# Patient Record
Sex: MALE | Race: ASIAN | NOT HISPANIC OR LATINO | URBAN - METROPOLITAN AREA
[De-identification: names, ages, dates, MRNs, and addresses within clinical notes are randomized per-mention and may not be internally consistent; named-entity substitution may affect disease eponyms.]

---

## 2018-11-12 ENCOUNTER — EMERGENCY (EMERGENCY)
Facility: HOSPITAL | Age: 28
LOS: 0 days | Discharge: HOME | End: 2018-11-12
Attending: EMERGENCY MEDICINE | Admitting: EMERGENCY MEDICINE
Payer: MEDICAID

## 2018-11-12 VITALS
OXYGEN SATURATION: 100 % | SYSTOLIC BLOOD PRESSURE: 118 MMHG | DIASTOLIC BLOOD PRESSURE: 74 MMHG | RESPIRATION RATE: 18 BRPM | HEART RATE: 92 BPM

## 2018-11-12 VITALS
DIASTOLIC BLOOD PRESSURE: 91 MMHG | OXYGEN SATURATION: 100 % | RESPIRATION RATE: 18 BRPM | HEART RATE: 69 BPM | TEMPERATURE: 98 F | WEIGHT: 169.98 LBS | SYSTOLIC BLOOD PRESSURE: 133 MMHG | HEIGHT: 66 IN

## 2018-11-12 DIAGNOSIS — R55 SYNCOPE AND COLLAPSE: ICD-10-CM

## 2018-11-12 LAB
ALBUMIN SERPL ELPH-MCNC: 4 G/DL — SIGNIFICANT CHANGE UP (ref 3.3–5)
ALP SERPL-CCNC: 47 U/L — SIGNIFICANT CHANGE UP (ref 40–120)
ALT FLD-CCNC: 29 U/L — SIGNIFICANT CHANGE UP (ref 12–78)
ANION GAP SERPL CALC-SCNC: 7 MMOL/L — SIGNIFICANT CHANGE UP (ref 5–17)
AST SERPL-CCNC: 15 U/L — SIGNIFICANT CHANGE UP (ref 15–37)
BASOPHILS # BLD AUTO: 0.01 K/UL — SIGNIFICANT CHANGE UP (ref 0–0.2)
BASOPHILS NFR BLD AUTO: 0.1 % — SIGNIFICANT CHANGE UP (ref 0–2)
BILIRUB SERPL-MCNC: 0.6 MG/DL — SIGNIFICANT CHANGE UP (ref 0.2–1.2)
BUN SERPL-MCNC: 20 MG/DL — SIGNIFICANT CHANGE UP (ref 7–23)
CALCIUM SERPL-MCNC: 8.4 MG/DL — LOW (ref 8.5–10.1)
CHLORIDE SERPL-SCNC: 108 MMOL/L — SIGNIFICANT CHANGE UP (ref 96–108)
CO2 SERPL-SCNC: 26 MMOL/L — SIGNIFICANT CHANGE UP (ref 22–31)
CREAT SERPL-MCNC: 1.09 MG/DL — SIGNIFICANT CHANGE UP (ref 0.5–1.3)
EOSINOPHIL # BLD AUTO: 0.02 K/UL — SIGNIFICANT CHANGE UP (ref 0–0.5)
EOSINOPHIL NFR BLD AUTO: 0.1 % — SIGNIFICANT CHANGE UP (ref 0–6)
GLUCOSE SERPL-MCNC: 123 MG/DL — HIGH (ref 70–99)
HCT VFR BLD CALC: 41.9 % — SIGNIFICANT CHANGE UP (ref 39–50)
HGB BLD-MCNC: 14.5 G/DL — SIGNIFICANT CHANGE UP (ref 13–17)
IMM GRANULOCYTES NFR BLD AUTO: 0.5 % — SIGNIFICANT CHANGE UP (ref 0–1.5)
LYMPHOCYTES # BLD AUTO: 1.2 K/UL — SIGNIFICANT CHANGE UP (ref 1–3.3)
LYMPHOCYTES # BLD AUTO: 7.4 % — LOW (ref 13–44)
MCHC RBC-ENTMCNC: 30.4 PG — SIGNIFICANT CHANGE UP (ref 27–34)
MCHC RBC-ENTMCNC: 34.6 GM/DL — SIGNIFICANT CHANGE UP (ref 32–36)
MCV RBC AUTO: 87.8 FL — SIGNIFICANT CHANGE UP (ref 80–100)
MONOCYTES # BLD AUTO: 0.95 K/UL — HIGH (ref 0–0.9)
MONOCYTES NFR BLD AUTO: 5.9 % — SIGNIFICANT CHANGE UP (ref 2–14)
NEUTROPHILS # BLD AUTO: 13.88 K/UL — HIGH (ref 1.8–7.4)
NEUTROPHILS NFR BLD AUTO: 86 % — HIGH (ref 43–77)
NRBC # BLD: 0 /100 WBCS — SIGNIFICANT CHANGE UP (ref 0–0)
PLATELET # BLD AUTO: 244 K/UL — SIGNIFICANT CHANGE UP (ref 150–400)
POTASSIUM SERPL-MCNC: 3.9 MMOL/L — SIGNIFICANT CHANGE UP (ref 3.5–5.3)
POTASSIUM SERPL-SCNC: 3.9 MMOL/L — SIGNIFICANT CHANGE UP (ref 3.5–5.3)
PROT SERPL-MCNC: 7 GM/DL — SIGNIFICANT CHANGE UP (ref 6–8.3)
RBC # BLD: 4.77 M/UL — SIGNIFICANT CHANGE UP (ref 4.2–5.8)
RBC # FLD: 11.8 % — SIGNIFICANT CHANGE UP (ref 10.3–14.5)
SODIUM SERPL-SCNC: 141 MMOL/L — SIGNIFICANT CHANGE UP (ref 135–145)
WBC # BLD: 16.14 K/UL — HIGH (ref 3.8–10.5)
WBC # FLD AUTO: 16.14 K/UL — HIGH (ref 3.8–10.5)

## 2018-11-12 PROCEDURE — 93010 ELECTROCARDIOGRAM REPORT: CPT

## 2018-11-12 PROCEDURE — 99283 EMERGENCY DEPT VISIT LOW MDM: CPT

## 2018-11-12 RX ORDER — SODIUM CHLORIDE 9 MG/ML
1000 INJECTION INTRAMUSCULAR; INTRAVENOUS; SUBCUTANEOUS ONCE
Qty: 0 | Refills: 0 | Status: COMPLETED | OUTPATIENT
Start: 2018-11-12 | End: 2018-11-12

## 2018-11-12 RX ADMIN — SODIUM CHLORIDE 1000 MILLILITER(S): 9 INJECTION INTRAMUSCULAR; INTRAVENOUS; SUBCUTANEOUS at 17:00

## 2018-11-12 RX ADMIN — SODIUM CHLORIDE 1000 MILLILITER(S): 9 INJECTION INTRAMUSCULAR; INTRAVENOUS; SUBCUTANEOUS at 16:00

## 2018-11-12 NOTE — ED PROVIDER NOTE - OBJECTIVE STATEMENT
27 y/o male with no pertinent PMHx  presents to the ED s/p near syncopal episode today after being stung by a bee on the right posterior neck. Sx's resolved PTA. Pt has been doing detox's for a few months now. The first one, pt took laxatives and had a syncopal episode after doing that. Pt stopped taking Miralax after incident. This time pt has been doing a salt water and lemon detox; pt drank 2 cups of that today and has been doing this for a few months. After pt was stung, he had sudden onset nausea, diaphoresis, and palpitations. Pt was aware he was losing consciousness. Has HA in ED. Denies vomiting. Denies illicit drug use, EtOH use. NKDA.

## 2018-11-12 NOTE — ED PROVIDER NOTE - NEUROLOGICAL, MLM
Alert and oriented, no focal deficits, no motor or sensory deficits. +5/5 strength in all 4 extremities. Cranial nerves 2-12 intact. Sensation intact. No dysmetria.

## 2018-11-12 NOTE — ED ADULT TRIAGE NOTE - CHIEF COMPLAINT QUOTE
Pt states bees ting this morning, states SOB, denies allergy to bees, airway patent, negative angioedema, no rash, airway patent. 100% spo2 on room air.

## 2018-11-12 NOTE — ED ADULT NURSE NOTE - OBJECTIVE STATEMENT
c/o SOB while doing yard work, states bee sting to back of neck at 1230, denies difficulty swallowing, denies angioadema. Denies history of bee sting allergy

## 2018-11-12 NOTE — ED PROVIDER NOTE - MEDICAL DECISION MAKING DETAILS
Pt presenting s/p near syncope event. Sounds vasovagal. VS stable. Place on cardiac monitor, fluid, labs, EKG. If normal will D/C with cardiac f/u.

## 2018-11-12 NOTE — ED PROVIDER NOTE - PROGRESS NOTE DETAILS
AJM: pt feeling improved. workup neg. stable for dc home with cards follow up. All results discussed with the patient, and a copy of results has been provided. Patient is comfortable with dc plan for home. Opportunity for questions was provided and all questions have been addressed. AJM: pt feeling improved. workup neg. stable for dc home with cards follow up. All results discussed with the patient, and a copy of results has been provided. Patient is comfortable with dc plan for home. Opportunity for questions was provided and all questions have been addressed. No signs of allergic reaction.

## 2019-03-16 ENCOUNTER — EMERGENCY (EMERGENCY)
Facility: HOSPITAL | Age: 29
LOS: 0 days | Discharge: HOME | End: 2019-03-16
Attending: EMERGENCY MEDICINE | Admitting: EMERGENCY MEDICINE
Payer: MEDICAID

## 2019-03-16 VITALS
OXYGEN SATURATION: 96 % | RESPIRATION RATE: 17 BRPM | DIASTOLIC BLOOD PRESSURE: 93 MMHG | HEIGHT: 65 IN | HEART RATE: 93 BPM | SYSTOLIC BLOOD PRESSURE: 137 MMHG | TEMPERATURE: 98 F | WEIGHT: 160.06 LBS

## 2019-03-16 DIAGNOSIS — R42 DIZZINESS AND GIDDINESS: ICD-10-CM

## 2019-03-16 DIAGNOSIS — B34.9 VIRAL INFECTION, UNSPECIFIED: ICD-10-CM

## 2019-03-16 DIAGNOSIS — Z11.59 ENCOUNTER FOR SCREENING FOR OTHER VIRAL DISEASES: ICD-10-CM

## 2019-03-16 LAB
ALBUMIN SERPL ELPH-MCNC: 4.1 G/DL — SIGNIFICANT CHANGE UP (ref 3.3–5)
ALP SERPL-CCNC: 83 U/L — SIGNIFICANT CHANGE UP (ref 40–120)
ALT FLD-CCNC: 149 U/L — HIGH (ref 12–78)
ANION GAP SERPL CALC-SCNC: 9 MMOL/L — SIGNIFICANT CHANGE UP (ref 5–17)
AST SERPL-CCNC: 102 U/L — HIGH (ref 15–37)
BASOPHILS # BLD AUTO: 0.01 K/UL — SIGNIFICANT CHANGE UP (ref 0–0.2)
BASOPHILS NFR BLD AUTO: 0.3 % — SIGNIFICANT CHANGE UP (ref 0–2)
BILIRUB SERPL-MCNC: 0.7 MG/DL — SIGNIFICANT CHANGE UP (ref 0.2–1.2)
BUN SERPL-MCNC: 9 MG/DL — SIGNIFICANT CHANGE UP (ref 7–23)
CALCIUM SERPL-MCNC: 8.9 MG/DL — SIGNIFICANT CHANGE UP (ref 8.5–10.1)
CHLORIDE SERPL-SCNC: 102 MMOL/L — SIGNIFICANT CHANGE UP (ref 96–108)
CO2 SERPL-SCNC: 27 MMOL/L — SIGNIFICANT CHANGE UP (ref 22–31)
CREAT SERPL-MCNC: 0.88 MG/DL — SIGNIFICANT CHANGE UP (ref 0.5–1.3)
EOSINOPHIL # BLD AUTO: 0.02 K/UL — SIGNIFICANT CHANGE UP (ref 0–0.5)
EOSINOPHIL NFR BLD AUTO: 0.5 % — SIGNIFICANT CHANGE UP (ref 0–6)
FLU A RESULT: SIGNIFICANT CHANGE UP
FLU A RESULT: SIGNIFICANT CHANGE UP
FLUAV AG NPH QL: SIGNIFICANT CHANGE UP
FLUBV AG NPH QL: SIGNIFICANT CHANGE UP
GLUCOSE SERPL-MCNC: 92 MG/DL — SIGNIFICANT CHANGE UP (ref 70–99)
HCT VFR BLD CALC: 46.2 % — SIGNIFICANT CHANGE UP (ref 39–50)
HGB BLD-MCNC: 16.3 G/DL — SIGNIFICANT CHANGE UP (ref 13–17)
IMM GRANULOCYTES NFR BLD AUTO: 0.3 % — SIGNIFICANT CHANGE UP (ref 0–1.5)
LYMPHOCYTES # BLD AUTO: 1.05 K/UL — SIGNIFICANT CHANGE UP (ref 1–3.3)
LYMPHOCYTES # BLD AUTO: 28.6 % — SIGNIFICANT CHANGE UP (ref 13–44)
MCHC RBC-ENTMCNC: 30.8 PG — SIGNIFICANT CHANGE UP (ref 27–34)
MCHC RBC-ENTMCNC: 35.3 GM/DL — SIGNIFICANT CHANGE UP (ref 32–36)
MCV RBC AUTO: 87.3 FL — SIGNIFICANT CHANGE UP (ref 80–100)
MONOCYTES # BLD AUTO: 0.43 K/UL — SIGNIFICANT CHANGE UP (ref 0–0.9)
MONOCYTES NFR BLD AUTO: 11.7 % — SIGNIFICANT CHANGE UP (ref 2–14)
NEUTROPHILS # BLD AUTO: 2.15 K/UL — SIGNIFICANT CHANGE UP (ref 1.8–7.4)
NEUTROPHILS NFR BLD AUTO: 58.6 % — SIGNIFICANT CHANGE UP (ref 43–77)
NRBC # BLD: 0 /100 WBCS — SIGNIFICANT CHANGE UP (ref 0–0)
PLATELET # BLD AUTO: 142 K/UL — LOW (ref 150–400)
POTASSIUM SERPL-MCNC: 4.1 MMOL/L — SIGNIFICANT CHANGE UP (ref 3.5–5.3)
POTASSIUM SERPL-SCNC: 4.1 MMOL/L — SIGNIFICANT CHANGE UP (ref 3.5–5.3)
PROT SERPL-MCNC: 7.7 GM/DL — SIGNIFICANT CHANGE UP (ref 6–8.3)
RBC # BLD: 5.29 M/UL — SIGNIFICANT CHANGE UP (ref 4.2–5.8)
RBC # FLD: 11.6 % — SIGNIFICANT CHANGE UP (ref 10.3–14.5)
RSV RESULT: SIGNIFICANT CHANGE UP
RSV RNA RESP QL NAA+PROBE: SIGNIFICANT CHANGE UP
SODIUM SERPL-SCNC: 138 MMOL/L — SIGNIFICANT CHANGE UP (ref 135–145)
WBC # BLD: 3.67 K/UL — LOW (ref 3.8–10.5)
WBC # FLD AUTO: 3.67 K/UL — LOW (ref 3.8–10.5)

## 2019-03-16 PROCEDURE — 99284 EMERGENCY DEPT VISIT MOD MDM: CPT

## 2019-03-16 PROCEDURE — 93010 ELECTROCARDIOGRAM REPORT: CPT

## 2019-03-16 RX ORDER — METOCLOPRAMIDE HCL 10 MG
10 TABLET ORAL ONCE
Qty: 0 | Refills: 0 | Status: COMPLETED | OUTPATIENT
Start: 2019-03-16 | End: 2019-03-16

## 2019-03-16 RX ORDER — SODIUM CHLORIDE 9 MG/ML
1000 INJECTION INTRAMUSCULAR; INTRAVENOUS; SUBCUTANEOUS ONCE
Qty: 0 | Refills: 0 | Status: COMPLETED | OUTPATIENT
Start: 2019-03-16 | End: 2019-03-16

## 2019-03-16 RX ORDER — KETOROLAC TROMETHAMINE 30 MG/ML
15 SYRINGE (ML) INJECTION ONCE
Qty: 0 | Refills: 0 | Status: DISCONTINUED | OUTPATIENT
Start: 2019-03-16 | End: 2019-03-16

## 2019-03-16 RX ORDER — DIPHENHYDRAMINE HCL 50 MG
50 CAPSULE ORAL ONCE
Qty: 0 | Refills: 0 | Status: COMPLETED | OUTPATIENT
Start: 2019-03-16 | End: 2019-03-16

## 2019-03-16 RX ORDER — ONDANSETRON 8 MG/1
1 TABLET, FILM COATED ORAL
Qty: 15 | Refills: 0
Start: 2019-03-16 | End: 2019-03-20

## 2019-03-16 RX ADMIN — Medication 10 MILLIGRAM(S): at 17:02

## 2019-03-16 RX ADMIN — Medication 15 MILLIGRAM(S): at 17:00

## 2019-03-16 RX ADMIN — Medication 50 MILLIGRAM(S): at 17:01

## 2019-03-16 RX ADMIN — SODIUM CHLORIDE 2000 MILLILITER(S): 9 INJECTION INTRAMUSCULAR; INTRAVENOUS; SUBCUTANEOUS at 17:01

## 2019-03-16 RX ADMIN — SODIUM CHLORIDE 1000 MILLILITER(S): 9 INJECTION INTRAMUSCULAR; INTRAVENOUS; SUBCUTANEOUS at 18:02

## 2019-03-16 NOTE — ED STATDOCS - CARE PLAN
Principal Discharge DX:	Viral infection  Secondary Diagnosis:	Myalgia  Secondary Diagnosis:	Nonintractable headache, unspecified chronicity pattern, unspecified headache type

## 2019-03-16 NOTE — ED ADULT NURSE NOTE - OBJECTIVE STATEMENT
pt presents to ED c/o 1 episode of syncope and not feeling well for a few days associated with gerneralized weakness, nausea, and subjective fever. denies any significant pmhx. no active distress. will ctm

## 2019-03-16 NOTE — ED STATDOCS - PROGRESS NOTE DETAILS
28 yo male 30 yo male with no significant PMH presents with near syncopal episode at home. Pt states he has not been feeling well since thursday with nausea, headache, and myalgia. Denies sick contacts, fever, chills,  sweating, cp, sob, abd pain, vomiting. -Rayo Serrato PA-C

## 2019-03-16 NOTE — ED STATDOCS - CPE ED ENMT NORM
[facility]    4/3/2018 9:42 AM    Max heart rate: 150 bpm  100%    72 year old    Wt Readings from Last 1 Encounters:   03/19/18 79.6 kg      Ht Readings from Last 1 Encounters:   03/19/18 5' 6.25\" (1.683 m)       Patient Type: Outpatient    Cardiac Markers: Not Applicable    Reason for test: coronary artery calcium score    Primary MD:  Tess   Surgeon:    Ordering MD:   Tess   Other:     Cardiologist Performing Test: NAVJOT Mcdonald    --------------------------------------------------------------------------------------------------------------------  HISTORY OF: HTN       PATIENT HAS HAD THE FOLLOWING IN THE LAST 24 HOURS:  meds.    Current Outpatient Prescriptions   Medication Sig Dispense Refill   • atorvastatin (LIPITOR) 20 MG tablet Take 1 tablet by mouth daily. 30 tablet 12   • amLODIPine (NORVASC) 10 MG tablet Take 10 mg by mouth nightly.     • lisinopril (PRINIVIL,ZESTRIL) 40 MG tablet Take 40 mg by mouth daily.     • Multiple Vitamins-Minerals (MULTIVITAMIN PO) Take 1 tablet by mouth daily.     • levothyroxine (SYNTHROID, LEVOTHROID) 88 MCG tablet Take 88 mcg by mouth daily.       No current facility-administered medications for this encounter.        ALLERGIES:  No Known Allergies    MEETS CRITERIA FOR STRESS TEST: Yes         NOTES:     TYPE OF TEST: Exercise stress test and Myocardial Perfusion    ABLE TO WALK: Yes     NEEDED: No   normal...

## 2019-03-16 NOTE — ED ADULT NURSE NOTE - NSIMPLEMENTINTERV_GEN_ALL_ED
Implemented All Universal Safety Interventions:  [place] to call system. Call bell, personal items and telephone within reach. Instruct patient to call for assistance. Room bathroom lighting operational. Non-slip footwear when patient is off stretcher. Physically safe environment: no spills, clutter or unnecessary equipment. Stretcher in lowest position, wheels locked, appropriate side rails in place.

## 2019-03-16 NOTE — ED ADULT NURSE NOTE - CHPI ED NUR SYMPTOMS NEG
no blurred vision/no dizziness/no loss of consciousness/no change in level of consciousness/no confusion/no fever

## 2019-03-16 NOTE — ED STATDOCS - OBJECTIVE STATEMENT
28y/o M w/ no significant PMHx presents to the ED s/p episode of nearsyncope at home.  Pt states he has not been feeling well for 3 days. Today he felt sudden onset of generalized weakness with fever, nausea HA and lightheadedness.  Pt denies CP, Abdominal pain, V/D or chills. Doesn't report any changes in bowel or bladder function.  Pt has no sick contact or recent travel outside of the country. Has no recieved influenza vaccine.  Pt states he has not taken any OTC medication to alleviate his symptoms.

## 2019-03-16 NOTE — ED STATDOCS - ATTENDING CONTRIBUTION TO CARE
I, Jacob Rodriguez, performed the initial face to face bedside interview with this patient regarding history of present illness, review of symptoms and relevant past medical, social and family history.  I completed an independent physical examination.  I was the initial provider who evaluated this patient. I have signed out the follow up of any pending tests (i.e. labs, radiological studies) to the ACP.  I have communicated the patient’s plan of care and disposition with the ACP.  The history, relevant review of systems, past medical and surgical history, medical decision making, and physical examination was documented by the scribe in my presence and I attest to the accuracy of the documentation.

## 2019-03-16 NOTE — ED ADULT TRIAGE NOTE - HEIGHT IN INCHES
Overview and  competed.   to Dr. Clancy.  Last RF 4/28/17 #30.  Not [facility] med.  Sent to provider.  Laila Vidal RN      4/25/17  2. ADHD (attention deficit hyperactivity disorder), inattentive type  - will continue patient on current regimen of vyvanse 30 mg in the morning and adderall 20mg in the afternoon. Controlled substance agreement signed today.       5

## 2019-03-16 NOTE — ED STATDOCS - ENMT, MLM
Nasal mucosa clear.  Mouth with normal mucosa  Throat +mildly erythematous has no vesicles, no oropharyngeal exudates and uvula is midline.

## 2019-03-16 NOTE — ED STATDOCS - CLINICAL SUMMARY MEDICAL DECISION MAKING FREE TEXT BOX
Pt w/ flu-like symptoms.  Plan: labs, IVF, RVR Pt w/ flu-like symptoms.  Plan: labs, IVF, RVR    Negative Flu and pt feeling better. Pt advised of elevated LFTs that he needs to f/u with his PMD and have then rechecked. Pt aware and agrees with plan. -Rayo Serrato PA-C

## 2019-04-09 ENCOUNTER — EMERGENCY (EMERGENCY)
Facility: HOSPITAL | Age: 29
LOS: 0 days | Discharge: HOME | End: 2019-04-09
Attending: EMERGENCY MEDICINE | Admitting: EMERGENCY MEDICINE
Payer: MEDICAID

## 2019-04-09 VITALS
DIASTOLIC BLOOD PRESSURE: 57 MMHG | HEART RATE: 85 BPM | OXYGEN SATURATION: 99 % | TEMPERATURE: 98 F | RESPIRATION RATE: 16 BRPM | SYSTOLIC BLOOD PRESSURE: 136 MMHG

## 2019-04-09 VITALS — WEIGHT: 199.96 LBS | HEIGHT: 67 IN

## 2019-04-09 DIAGNOSIS — M79.672 PAIN IN LEFT FOOT: ICD-10-CM

## 2019-04-09 DIAGNOSIS — G89.11 ACUTE PAIN DUE TO TRAUMA: ICD-10-CM
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
## 2019-04-09 DIAGNOSIS — S93.402A SPRAIN OF UNSPECIFIED LIGAMENT OF LEFT ANKLE, INITIAL ENCOUNTER: ICD-10-CM

## 2019-04-09 DIAGNOSIS — M93.272 OSTEOCHONDRITIS DISSECANS, LEFT ANKLE AND JOINTS OF LEFT FOOT: ICD-10-CM

## 2019-04-09 PROCEDURE — 29515 APPLICATION SHORT LEG SPLINT: CPT | Mod: LT

## 2019-04-09 PROCEDURE — 73590 X-RAY EXAM OF LOWER LEG: CPT | Mod: 26,LT

## 2019-04-09 PROCEDURE — 99283 EMERGENCY DEPT VISIT LOW MDM: CPT | Mod: 25

## 2019-04-09 PROCEDURE — 73610 X-RAY EXAM OF ANKLE: CPT | Mod: 26,LT

## 2019-04-09 NOTE — ED STATDOCS - PROGRESS NOTE DETAILS
Patient seen and evaluated.  questional fx vs cyst on talus of ankle xray.  Given significant swelling and pain with ambulation, patient placed in splint and made nonweight bearing.  He will follow up with orthopedics for further management -Raúl Banks PA-C

## 2019-04-09 NOTE — ED STATDOCS - ATTENDING CONTRIBUTION TO CARE
I, Anahi Warner MD,  performed the initial face to face bedside interview with this patient regarding history of present illness, review of symptoms and relevant past medical, social and family history.  I completed an independent physical examination.  I was the initial provider who evaluated this patient. I have signed out the follow up of any pending tests (i.e. labs, radiological studies) to the ACP.  I have communicated the patient’s plan of care and disposition with the ACP.  The history, relevant review of systems, past medical and surgical history, medical decision making, and physical examination was documented by the scribe in my presence and I attest to the accuracy of the documentation.

## 2019-04-09 NOTE — ED ADULT NURSE NOTE - OBJECTIVE STATEMENT
pt c/o left ankle pain yesterday while at work. pt stepped off a loading ramp and twisted. pt denies numbness/ tingling.

## 2019-04-09 NOTE — ED ADULT TRIAGE NOTE - CHIEF COMPLAINT QUOTE
stepped out of van and twisted his left foot yesterday, now unable to bear weight on foot.  patient did not take any medications for the pain

## 2019-04-09 NOTE — ED STATDOCS - NSFOLLOWUPINSTRUCTIONS_ED_ALL_ED_FT
Sprain    WHAT YOU NEED TO KNOW:    A sprain happens when a ligament is stretched or torn. Ligaments are tough tissues that connect bones. Ligaments support your joints and keep your bones in place. They allow you to lift, lower, or rotate your arms and legs. A sprain may involve one or more ligaments.     DISCHARGE INSTRUCTIONS:    Return to the emergency department if:     You have numbness or tingling below the injury, such as in your fingers or toes.      The skin over your sprained area is blue or pale.       Your pain has increased or returned, even after you take pain medicine.    Contact your healthcare provider if:     Your symptoms do not better.      Your swelling has increased or returned.      Your joint becomes more weak or unstable.      You have questions or concerns about your condition or care.    Medicines:     Prescription pain medicine may be given. Ask how to take this medicine safely.      Acetaminophen decreases pain and fever. It is available without a doctor's order. Ask how much to take and how often to take it. Follow directions. Acetaminophen can cause liver damage if not taken correctly.      NSAIDs, such as ibuprofen, help decrease swelling, pain, and fever. This medicine is available with or without a doctor's order. NSAIDs can cause stomach bleeding or kidney problems in certain people. If you take blood thinner medicine, always ask your healthcare provider if NSAIDs are safe for you. Always read the medicine label and follow directions.      Take your medicine as directed. Contact your healthcare provider if you think your medicine is not helping or if you have side effects. Tell him or her if you are allergic to any medicine. Keep a list of the medicines, vitamins, and herbs you take. Include the amounts, and when and why you take them. Bring the list or the pill bottles to follow-up visits. Carry your medicine list with you in case of an emergency.    Support devices: Support services, such as an elastic bandage, splint, brace, or cast may be needed. These devices limit your movement and protect your joint. You may need to use crutches if the sprain is in your leg. This will help decrease your pain as you move around.     Self-care:     Rest your joint so that it can heal. Return to normal activities as directed.      Apply ice on your injury for 15 to 20 minutes every hour or as directed. Use an ice pack, or put crushed ice in a plastic bag. Cover it with a towel. Ice helps prevent tissue damage and decreases swelling and pain.      Compress the injured area as directed. Ask your healthcare provider if you should wrap an elastic bandage around your injured ligament. An elastic bandage provides support and helps decrease swelling and movement so your joint can heal.       Elevate the injured area above the level of your heart as often as you can. This will help decrease swelling and pain. Prop the injured area on pillows or blankets to keep it elevated comfortably.     Physical therapy: A physical therapist teaches you exercises to help improve movement and strength, and to decrease pain.     Prevent another sprain: Regular exercise can strengthen your muscles and help prevent another injury. Do the following before you begin or return to regular exercise or sports training:     Ask your healthcare provider about the activities you can do. Find out how long your ligament needs to heal. Do not do any physical activity until your healthcare provider says it is okay. If you start activity too soon, you may develop a more serious injury.       Always warm up and stretch before your regular exercise, sport, or physical activity.       Take it slow. Slowly increase how often and how long you exercise or train. Sudden increases in how often you train may cause you to overstretch or tear your ligament.       Use the right equipment. Always wear shoes that fit well and are made for the activity that you are doing. You may also use ankle supports, elbow and knee pads, or braces.     Follow up with your healthcare provider as directed: Write down your questions so you remember to ask them during your visits.    Splint Care    WHAT YOU NEED TO KNOW:    Splint care is important to help protect your splint until it comes off. Some splints are made of fiberglass or plaster that will need to dry and harden. Splint care will help the splint dry and harden correctly. Even after your splint hardens, it can be damaged.    DISCHARGE INSTRUCTIONS:    Return to the emergency department if:     You have increased pain.      Your fingers or toes are numb or tingling.      You feel burning or stinging around your injury.      Your nails, fingers, or toes turn pale, blue, or gray, and feel cold.      You have new or increased trouble moving your fingers or toes.      Your swelling gets worse.      The skin under your splint is bleeding or leaking pus.     Contact your healthcare provider if:     Your hard splint gets wet or is damaged.      You have a fever.      Your splint feels tighter.      You have itchy, dry skin under your splint that is getting worse.      The skin under your splint is red, or you have a new sore.      You notice a bad smell coming from your splint.       You have questions or concerns about your condition or care.    How to care for your splint:     Wait for your hard splint to harden completely. You may have to wait up to 3 days before you can walk on a plaster splint.      Check your splint and the skin around it each day. Check your splint for damage, such as cracks and breaks. Check your skin for redness, increased swelling, and sores. Loosen the elastic bandage around your splint if it feels too tight.      Keep your splint clean and dry. Keep dirt out of your splint. Before you bathe, wrap your hard splint with 2 layers of plastic. Then put a plastic bag over it. Keep the plastic bag tightly sealed. You can also ask your healthcare provider about waterproof shields. Do not put your hard splint in the water, even with a plastic bag over it. A wet splint can make your skin itchy, and may lead to infection.      Do not put powders or deodorants inside your splint. These can dry your skin and increase itching.       Do not try to scratch the skin inside your hard splint with sharp objects. Sharp objects can break off inside your splint or hurt your skin.       Do not pull the padding out of your splint. The padding inside your splint protects your skin. You may develop a sore on your skin if you take out the padding.    Follow up with your healthcare provider as directed within 1 to 2 weeks: Write down your questions so you remember to ask them during your visits.

## 2019-04-09 NOTE — ED STATDOCS - MUSCULOSKELETAL, MLM
+Tenderness to left medial malleolus and left medial fibula. Skin intact with swelling. Distally intact neurovascularly.

## 2019-04-09 NOTE — ED STATDOCS - CARE PROVIDER_API CALL
Rayo Avelar)  Orthopaedic Surgery; Surgery of the Hand  [address] [place] [address]  [place] [address]  Phone: [phone]  Fax: [phone]  Follow Up Time:

## 2019-04-09 NOTE — ED STATDOCS - OBJECTIVE STATEMENT
30 y/o male with a no relevant PMHx presents to the ED c/o left foot/ankle pain since yesterday. Pt reports stepping out of a van around 14:00 yesterday with onset of left foot pain. Pt cannot ambulate secondary to pain and presents with swelling to LLE. Skin around region intact. No h/o fractures. No EtOH. No illicit drug use. Non-smoker.

## 2019-04-09 NOTE — ED ADULT NURSE NOTE - NSIMPLEMENTINTERV_GEN_ALL_ED
Implemented All Universal Safety Interventions:  [place] to call system. Call bell, personal items and telephone within reach. Instruct patient to call for assistance. Room bathroom lighting operational. Non-slip footwear when patient is off stretcher. Physically safe environment: no spills, clutter or unnecessary equipment. Stretcher in lowest position, wheels locked, appropriate side rails in place.

## 2019-04-09 NOTE — ED PROCEDURE NOTE - CPROC ED POST PROC CARE GUIDE1
Keep the cast/splint/dressing clean and dry./Instructed patient/caregiver regarding signs and symptoms of infection./Instructed patient/caregiver to follow-up with primary care physician./Elevate the injured extremity as instructed./Verbal/written post procedure instructions were given to patient/caregiver.

## 2021-04-14 ENCOUNTER — EMERGENCY (EMERGENCY)
Facility: HOSPITAL | Age: 31
LOS: 0 days | Discharge: HOME | End: 2021-04-14
Attending: FAMILY MEDICINE
Payer: MEDICAID

## 2021-04-14 VITALS
OXYGEN SATURATION: 96 % | HEART RATE: 97 BPM | WEIGHT: 179.9 LBS | SYSTOLIC BLOOD PRESSURE: 146 MMHG | HEIGHT: 67 IN | RESPIRATION RATE: 16 BRPM | DIASTOLIC BLOOD PRESSURE: 103 MMHG | TEMPERATURE: 98 F

## 2021-04-14 VITALS
HEART RATE: 92 BPM | RESPIRATION RATE: 17 BRPM | DIASTOLIC BLOOD PRESSURE: 95 MMHG | SYSTOLIC BLOOD PRESSURE: 153 MMHG | OXYGEN SATURATION: 97 % | TEMPERATURE: 98 F

## 2021-04-14 DIAGNOSIS — R21 RASH AND OTHER NONSPECIFIC SKIN ERUPTION: ICD-10-CM

## 2021-04-14 DIAGNOSIS — L23.7 ALLERGIC CONTACT DERMATITIS DUE TO PLANTS, EXCEPT FOOD: ICD-10-CM

## 2021-04-14 PROCEDURE — 99283 EMERGENCY DEPT VISIT LOW MDM: CPT

## 2021-04-14 PROCEDURE — 99284 EMERGENCY DEPT VISIT MOD MDM: CPT

## 2021-04-14 RX ADMIN — Medication 40 MILLIGRAM(S): at 19:24

## 2021-04-14 NOTE — ED STATDOCS - OBJECTIVE STATEMENT
30 y/o male with no pertinent PMHx presents to the ED c/o right eye swelling and redness, rash to right hand, and itching to tip of genitalia. Pt states he was walking outside when symptoms onset. Pt states he does have a dog that regularly goes outside, and states he does walk his dog.  Denies fevers, SOB, cough, CP, visual changes, difficulty swallowing, difficulty speaking. No other complaints at this time. Pt does not presently follow with a PCP.

## 2021-04-14 NOTE — ED STATDOCS - SKIN, MLM
skin normal color for race, warm, dry and intact. +Vesiculation on first and 2nd knuckles, with some erythema and weeping. Linear erythema near right lower lid, with some areas of yellowish vesicles.

## 2021-04-14 NOTE — ED ADULT TRIAGE NOTE - CHIEF COMPLAINT QUOTE
Pt arrives to ED complaining of right eye inflammation and rash to hands. pt denies itching or trouble breathing.

## 2021-04-14 NOTE — ED STATDOCS - PATIENT PORTAL LINK FT
You can access the FollowMyHealth Patient Portal offered by [facility] by registering at the following website: [email or web address]. By joining [employer]’s FollowMyHealth portal, you will also be able to view your health information using other applications (apps) compatible with our system.

## 2021-04-14 NOTE — ED STATDOCS - CARE PROVIDER_API CALL
Madi Silveira)  Dermatology  [address] [place] [address]  Phone: [phone]  Fax: [phone]  Follow Up Time:

## 2021-04-14 NOTE — ED ADULT NURSE NOTE - NSIMPLEMENTINTERV_GEN_ALL_ED
Implemented All Universal Safety Interventions:  [place] to call system. Call bell, personal items and telephone within reach. Instruct patient to call for assistance. Room bathroom lighting operational. Non-slip footwear when patient is off stretcher. Physically safe environment: no spills, clutter or unnecessary equipment. Stretcher in lowest position, wheels locked, appropriate side rails in place.

## 2021-04-14 NOTE — ED STATDOCS - ATTENDING CONTRIBUTION TO CARE
pt eval, treatment and plan contemporaneously with RODRIGO Barber. Agree with notes. Rodrigo URRUTIA

## 2021-04-14 NOTE — ED STATDOCS - PROGRESS NOTE DETAILS
signed Pauline Barber PA-C Pt seen initially in intake by Dr Wallace.   31M with itchy rash to right hand, around right orbital region, and on genitals. Some of rash is vesicular with likely appearance of poison ivy dermatitis. Pt declines genital exam. Will give rx for steroids and recommend OTC Benadryl. Discussed importance of derm f/u while still on taper with pt. Offered him appointment assistance with Roselyn but he declines and will make appt himself. Pt feeling well at CT, agrees with DC and plan of care.

## 2021-04-14 NOTE — ED STATDOCS - NSFOLLOWUPINSTRUCTIONS_ED_ALL_ED_FT
Poison Ivy    WHAT YOU NEED TO KNOW:    Poison ivy is a plant that can cause an itchy, uncomfortable rash on your skin. Poison ivy grows as a shrub or vine in woods, fields, and areas of thick underbrush. It has 3 bright green leaves on each stem that turn red in key.     DISCHARGE INSTRUCTIONS:    Medicines:   •Antiseptic or drying creams or ointments: These medicines may be used to dry out the rash and decrease the itching. These products may be available without a doctor's order.       •Steroids: This medicine helps decrease itching and inflammation. It can be given as a cream to apply to your skin or as a pill.       •Antihistamines: This medicine may help decrease itching and help you sleep. It is available without a doctor's order.       •Take your medicine as directed. Contact your healthcare provider if you think your medicine is not helping or if you have side effects. Tell him or her if you are allergic to any medicine. Keep a list of the medicines, vitamins, and herbs you take. Include the amounts, and when and why you take them. Bring the list or the pill bottles to follow-up visits. Carry your medicine list with you in case of an emergency.      Follow up with your healthcare provider as directed: Write down your questions so you remember to ask them during your visits.     How your poison ivy rash spreads: You cannot spread poison ivy by touching your rash or the liquid from your blisters. Poison ivy is spread only if you scratch your skin while it still has oil on it. You may think your rash is spreading because new rashes appear over a number of days. This happens because areas covered by thin skin break out in a rash first. Your face or forearms may develop a rash before thicker areas, such as the palms of your hands.     Self-care:   •Keep your rash clean and dry: Wash it with soap and water. Gently pat it dry with a clean towel.      •Try not to scratch or rub your rash: This can cause your skin to become infected.      •Use a compress on your rash: Dip a clean washcloth in cool water. Wring it out and place it on your rash. Leave the washcloth on your skin for 15 minutes. Do this at least 3 times per day.       •Take a cornstarch or oatmeal bath: If your rash is too large to cover with wet washcloths, take 3 or 4 cornstarch baths daily. Mix 1 pound of cornstarch with a little water to make a paste. Add the paste to a tub full of water and mix well. You may also use colloidal oatmeal in the bath water. Use lukewarm water. Avoid hot water because it may cause your itching to increase.      Prevent a poison ivy rash in the future:   •Wear skin protection: Wear long pants, a long-sleeved shirt, and gloves. Use a skin block lotion to protect your skin from poison ivy oil. You can find this at a drugstore without a prescription.      •Wash clothing after possible exposure: If you think you have been near a poison ivy plant, wash the clothes you were wearing separately from other clothes. Rinse the washing machine well after you take the clothes out. Scrub boots and shoes with warm, soapy water. Dry clean items and clothing that you cannot wash in water. Poison ivy oil is sticky and can stay on surfaces for a long time. It can cause a new rash even years later.       •Bathe your pet: Use warm water and shampoo on your pet's fur. This will prevent the spread of oil to your skin, car, and home. Wear long sleeves, long pants, and gloves while washing pets or any items that may have oil on them.      •Reduce exposure to poison ivy: Do not touch plants that look like poison ivy. Keep your yard free of poison ivy. While protecting your skin, remove the plant and the roots. Place them in a plastic bag and seal the bag tightly.       •Do not burn poison ivy plants: This can spread the oil through the air. If you breathe the oil into your lungs, you could have swelling and serious breathing problems. Oil that clings to the fire marian can land on your skin and cause a rash.      Contact your healthcare provider if:   •You have pus, soft yellow scabs, or tenderness on the rash.      •The itching gets worse or keeps you awake at night.      •The rash covers more than 1/4 of your skin or spreads to your eyes, mouth, or genital area.       •The rash is not better after 2 to 3 weeks.      •You have tender, swollen glands on the sides of your neck.      •You have swelling in your arms and legs.      •You have questions or concerns about your condition or care.      Seek care immediately or call 911 if:   •You have a fever.      •You have redness, swelling, and tenderness around the rash.      •You have trouble breathing.         © Copyright [employer] 2021           FOLLOW UP WITH A DERMATOLOGIST THIS WEEK, WHILE YOU ARE STILL ON THE STEROIDS TO SEE HOW YOU ARE DOING. CALL THE OFFICE TO MAKE AN APPOINTMENT. RETURN TO ER FOR ANY WORSENING SYMPTOMS OR NEW CONCERNS.

## 2021-04-14 NOTE — ED STATDOCS - CARE PLAN
Principal Discharge DX:	Rash and nonspecific skin eruption   Principal Discharge DX:	Poison ivy dermatitis

## 2021-04-14 NOTE — ED ADULT NURSE NOTE - OBJECTIVE STATEMENT
Pt  c/o right eye swelling and redness, rash to right hand, and itching to tip of genitalia.   Denies fevers, SOB, cough, CP, visual changes, difficulty swallowing, difficulty speaking.

## 2021-04-15 PROBLEM — Z78.9 OTHER SPECIFIED HEALTH STATUS: Chronic | Status: ACTIVE | Noted: 2019-04-09

## 2021-08-02 ENCOUNTER — EMERGENCY (EMERGENCY)
Facility: HOSPITAL | Age: 31
LOS: 0 days | Discharge: HOME | End: 2021-08-03
Attending: EMERGENCY MEDICINE
Payer: MEDICAID

## 2021-08-02 VITALS — HEIGHT: 67 IN | HEART RATE: 84 BPM | OXYGEN SATURATION: 96 % | WEIGHT: 229.94 LBS

## 2021-08-02 DIAGNOSIS — R53.83 OTHER FATIGUE: ICD-10-CM

## 2021-08-02 DIAGNOSIS — R06.02 SHORTNESS OF BREATH: ICD-10-CM

## 2021-08-02 DIAGNOSIS — E86.0 DEHYDRATION: ICD-10-CM

## 2021-08-02 DIAGNOSIS — Z20.822 CONTACT WITH AND (SUSPECTED) EXPOSURE TO COVID-19: ICD-10-CM

## 2021-08-02 LAB
ALBUMIN SERPL ELPH-MCNC: 4.1 G/DL — SIGNIFICANT CHANGE UP (ref 3.3–5)
ALP SERPL-CCNC: 54 U/L — SIGNIFICANT CHANGE UP (ref 40–120)
ALT FLD-CCNC: 79 U/L — HIGH (ref 12–78)
ANION GAP SERPL CALC-SCNC: 5 MMOL/L — SIGNIFICANT CHANGE UP (ref 5–17)
AST SERPL-CCNC: 34 U/L — SIGNIFICANT CHANGE UP (ref 15–37)
BASOPHILS # BLD AUTO: 0.03 K/UL — SIGNIFICANT CHANGE UP (ref 0–0.2)
BASOPHILS NFR BLD AUTO: 0.3 % — SIGNIFICANT CHANGE UP (ref 0–2)
BILIRUB SERPL-MCNC: 0.4 MG/DL — SIGNIFICANT CHANGE UP (ref 0.2–1.2)
BUN SERPL-MCNC: 11 MG/DL — SIGNIFICANT CHANGE UP (ref 7–23)
CALCIUM SERPL-MCNC: 9.4 MG/DL — SIGNIFICANT CHANGE UP (ref 8.5–10.1)
CHLORIDE SERPL-SCNC: 102 MMOL/L — SIGNIFICANT CHANGE UP (ref 96–108)
CO2 SERPL-SCNC: 26 MMOL/L — SIGNIFICANT CHANGE UP (ref 22–31)
CREAT SERPL-MCNC: 0.85 MG/DL — SIGNIFICANT CHANGE UP (ref 0.5–1.3)
D DIMER BLD IA.RAPID-MCNC: 346 NG/ML DDU — HIGH
EOSINOPHIL # BLD AUTO: 0.13 K/UL — SIGNIFICANT CHANGE UP (ref 0–0.5)
EOSINOPHIL NFR BLD AUTO: 1.3 % — SIGNIFICANT CHANGE UP (ref 0–6)
GLUCOSE SERPL-MCNC: 117 MG/DL — HIGH (ref 70–99)
HCT VFR BLD CALC: 43.4 % — SIGNIFICANT CHANGE UP (ref 39–50)
HGB BLD-MCNC: 15.5 G/DL — SIGNIFICANT CHANGE UP (ref 13–17)
IMM GRANULOCYTES NFR BLD AUTO: 0.7 % — SIGNIFICANT CHANGE UP (ref 0–1.5)
LYMPHOCYTES # BLD AUTO: 1.78 K/UL — SIGNIFICANT CHANGE UP (ref 1–3.3)
LYMPHOCYTES # BLD AUTO: 17.5 % — SIGNIFICANT CHANGE UP (ref 13–44)
MAGNESIUM SERPL-MCNC: 2 MG/DL — SIGNIFICANT CHANGE UP (ref 1.6–2.6)
MCHC RBC-ENTMCNC: 30.9 PG — SIGNIFICANT CHANGE UP (ref 27–34)
MCHC RBC-ENTMCNC: 35.7 GM/DL — SIGNIFICANT CHANGE UP (ref 32–36)
MCV RBC AUTO: 86.5 FL — SIGNIFICANT CHANGE UP (ref 80–100)
MONOCYTES # BLD AUTO: 0.53 K/UL — SIGNIFICANT CHANGE UP (ref 0–0.9)
MONOCYTES NFR BLD AUTO: 5.2 % — SIGNIFICANT CHANGE UP (ref 2–14)
NEUTROPHILS # BLD AUTO: 7.62 K/UL — HIGH (ref 1.8–7.4)
NEUTROPHILS NFR BLD AUTO: 75 % — SIGNIFICANT CHANGE UP (ref 43–77)
PLATELET # BLD AUTO: 291 K/UL — SIGNIFICANT CHANGE UP (ref 150–400)
POTASSIUM SERPL-MCNC: 3.8 MMOL/L — SIGNIFICANT CHANGE UP (ref 3.5–5.3)
POTASSIUM SERPL-SCNC: 3.8 MMOL/L — SIGNIFICANT CHANGE UP (ref 3.5–5.3)
PROT SERPL-MCNC: 7.6 GM/DL — SIGNIFICANT CHANGE UP (ref 6–8.3)
RBC # BLD: 5.02 M/UL — SIGNIFICANT CHANGE UP (ref 4.2–5.8)
RBC # FLD: 11.9 % — SIGNIFICANT CHANGE UP (ref 10.3–14.5)
SODIUM SERPL-SCNC: 133 MMOL/L — LOW (ref 135–145)
TROPONIN I SERPL-MCNC: <0.015 NG/ML — SIGNIFICANT CHANGE UP (ref 0.01–0.04)
WBC # BLD: 10.16 K/UL — SIGNIFICANT CHANGE UP (ref 3.8–10.5)
WBC # FLD AUTO: 10.16 K/UL — SIGNIFICANT CHANGE UP (ref 3.8–10.5)

## 2021-08-02 PROCEDURE — U0003: CPT

## 2021-08-02 PROCEDURE — 93010 ELECTROCARDIOGRAM REPORT: CPT

## 2021-08-02 PROCEDURE — 71275 CT ANGIOGRAPHY CHEST: CPT

## 2021-08-02 PROCEDURE — 84484 ASSAY OF TROPONIN QUANT: CPT

## 2021-08-02 PROCEDURE — 99284 EMERGENCY DEPT VISIT MOD MDM: CPT | Mod: 25

## 2021-08-02 PROCEDURE — 85025 COMPLETE CBC W/AUTO DIFF WBC: CPT

## 2021-08-02 PROCEDURE — 85379 FIBRIN DEGRADATION QUANT: CPT

## 2021-08-02 PROCEDURE — 71046 X-RAY EXAM CHEST 2 VIEWS: CPT

## 2021-08-02 PROCEDURE — 80053 COMPREHEN METABOLIC PANEL: CPT

## 2021-08-02 PROCEDURE — U0005: CPT

## 2021-08-02 PROCEDURE — 83735 ASSAY OF MAGNESIUM: CPT

## 2021-08-02 PROCEDURE — 36415 COLL VENOUS BLD VENIPUNCTURE: CPT

## 2021-08-02 PROCEDURE — 99285 EMERGENCY DEPT VISIT HI MDM: CPT

## 2021-08-02 PROCEDURE — 93005 ELECTROCARDIOGRAM TRACING: CPT

## 2021-08-02 RX ORDER — SODIUM CHLORIDE 9 MG/ML
1000 INJECTION INTRAMUSCULAR; INTRAVENOUS; SUBCUTANEOUS ONCE
Refills: 0 | Status: COMPLETED | OUTPATIENT
Start: 2021-08-02 | End: 2021-08-02

## 2021-08-02 RX ADMIN — SODIUM CHLORIDE 2000 MILLILITER(S): 9 INJECTION INTRAMUSCULAR; INTRAVENOUS; SUBCUTANEOUS at 22:55

## 2021-08-02 NOTE — ED STATDOCS - NSFOLLOWUPINSTRUCTIONS_ED_ALL_ED_FT
Dehydration, Adult    Dehydration is a condition in which there is not enough fluid or water in the body. This happens when you lose more fluids than you take in. Important organs, such as the kidneys, brain, and heart, cannot function without a proper amount of fluids. Any loss of fluids from the body can lead to dehydration.     Dehydration can range from mild to severe. This condition should be treated right away to prevent it from becoming severe.    What are the causes?  This condition may be caused by:    Vomiting.  Diarrhea.  Excessive sweating, such as from heat exposure or exercise.  Not drinking enough fluid, especially:  When ill.  While doing activity that requires a lot of energy.  Excessive urination.  Fever.  Infection.  Certain medicines, such as medicines that cause the body to lose excess fluid (diuretics).  Inability to access safe drinking water.  Reduced physical ability to get adequate water and food.    What increases the risk?  This condition is more likely to develop in people:    Who have a poorly controlled long-term (chronic) illness, such as diabetes, heart disease, or kidney disease.  Who are age 65 or older.  Who are disabled.  Who live in a place with high altitude.  Who play endurance sports.    What are the signs or symptoms?  Symptoms of mild dehydration may include:    Thirst.  Dry lips.  Slightly dry mouth.  Dry, warm skin.  Dizziness.    Symptoms of moderate dehydration may include:    Very dry mouth.  Muscle cramps.  Dark urine. Urine may be the color of tea.  Decreased urine production.  Decreased tear production.  Heartbeat that is irregular or faster than normal (palpitations).  Headache.  Light-headedness, especially when you stand up from a sitting position.  Fainting (syncope).    Symptoms of severe dehydration may include:    Changes in skin, such as:  Cold and clammy skin.  Blotchy (mottled) or pale skin.  Skin that does not quickly return to normal after being lightly pinched and released (poor skin turgor).  Changes in body fluids, such as:  Extreme thirst.  No tear production.  Inability to sweat when body temperature is high, such as in hot weather.  Very little urine production.  Changes in vital signs, such as:  Weak pulse.  Pulse that is more than 100 beats a minute when sitting still.  Rapid breathing.  Low blood pressure.  Other changes, such as:  Sunken eyes.  Cold hands and feet.  Confusion.  Lack of energy (lethargy).  Difficulty waking up from sleep.  Short-term weight loss.  Unconsciousness.    How is this diagnosed?  This condition is diagnosed based on your symptoms and a physical exam. Blood and urine tests may be done to help confirm the diagnosis.    How is this treated?  Treatment for this condition depends on the severity. Mild or moderate dehydration can often be treated at home. Treatment should be started right away. Do not wait until dehydration becomes severe. Severe dehydration is an emergency and it needs to be treated in a hospital.    Treatment for mild dehydration may include:    Drinking more fluids.  Replacing salts and minerals in your blood (electrolytes) that you may have lost.    Treatment for moderate dehydration may include:    Drinking an oral rehydration solution (ORS). This is a drink that helps you replace fluids and electrolytes (rehydrate). It can be found at pharmacies and retail stores.    Treatment for severe dehydration may include:    Receiving fluids through an IV tube.  Receiving an electrolyte solution through a feeding tube that is passed through your nose and into your stomach (nasogastric tube, or NG tube).  Correcting any abnormalities in electrolytes.  Treating the underlying cause of dehydration.    Follow these instructions at home:  If directed by your health care provider, drink an ORS:  Make an ORS by following instructions on the package.  Start by drinking small amounts, about ½ cup (120 mL) every 5–10 minutes.  Slowly increase how much you drink until you have taken the amount recommended by your health care provider.  Drink enough clear fluid to keep your urine clear or pale yellow. If you were told to drink an ORS, finish the ORS first, then start slowly drinking other clear fluids. Drink fluids such as:  Water. Do not drink only water. Doing that can lead to having too little salt (sodium) in the body (hyponatremia).  Ice chips.  Fruit juice that you have added water to (diluted fruit juice).  Low-calorie sports drinks.  Avoid:  Alcohol.  Drinks that contain a lot of sugar. These include high-calorie sports drinks, fruit juice that is not diluted, and soda.  Caffeine.  Foods that are greasy or contain a lot of fat or sugar.  Take over-the-counter and prescription medicines only as told by your health care provider.   Do not take sodium tablets. This can lead to having too much sodium in the body (hypernatremia).  Eat foods that contain a healthy balance of electrolytes, such as bananas, oranges, potatoes, tomatoes, and spinach.  Keep all follow-up visits as told by your health care provider. This is important.    Contact a health care provider if:  You have abdominal pain that:  Gets worse.  Stays in one area (localizes).  You have a rash.  You have a stiff neck.  You are more irritable than usual.  You are sleepier or more difficult to wake up than usual.  You feel weak or dizzy.  You feel very thirsty.  You have urinated only a small amount of very dark urine over 6–8 hours.    Get help right away if:  You have symptoms of severe dehydration.  You cannot drink fluids without vomiting.  Your symptoms get worse with treatment.  You have a fever.  You have a severe headache.  You have vomiting or diarrhea that:  Gets worse.  Does not go away.  You have blood or green matter (bile) in your vomit.  You have blood in your stool. This may cause stool to look black and tarry.  You have not urinated in 6–8 hours.  You faint.  Your heart rate while sitting still is over 100 beats a minute.  You have trouble breathing.    ADDITIONAL NOTES AND INSTRUCTIONS    Please follow up with your Primary MD in 24-48 hr.  Seek immediate medical care for any new/worsening signs or symptoms.

## 2021-08-02 NOTE — ED STATDOCS - PATIENT PORTAL LINK FT
You can access the FollowMyHealth Patient Portal offered by [facility] by registering at the following website: [email or web address]. By joining [employer]’s FollowMyHealth portal, you will also be able to view your health information using other applications (apps) compatible with our system.

## 2021-08-02 NOTE — ED ADULT NURSE NOTE - OBJECTIVE STATEMENT
Pt c/o sudden onset of SOB today. Pt states he developed fatigue over the course of the day. Pt denies n/v/d.

## 2021-08-02 NOTE — ED ADULT TRIAGE NOTE - CHIEF COMPLAINT QUOTE
pt c/o difficulty breathing, headache starting couple hours. appears anxious at triage, slow to answer questions. no respiratory distress. hr-84, o2-96%

## 2021-08-02 NOTE — ED STATDOCS - OBJECTIVE STATEMENT
30 y/o male with no relevant PMHx presents to the ED c/o nonspecific sudden onset SOB. Denies cough, fever, chest pain. +fatigue. At noon today pt had less than one alcoholic drink, felt fine then but now with symptoms. No sick contacts. Denies tobacco use, EtOH use, illicit drug use. No recent travel. Not vaccinated against COVID-19.

## 2021-08-03 VITALS
RESPIRATION RATE: 16 BRPM | DIASTOLIC BLOOD PRESSURE: 106 MMHG | TEMPERATURE: 98 F | SYSTOLIC BLOOD PRESSURE: 114 MMHG | OXYGEN SATURATION: 98 % | HEART RATE: 90 BPM

## 2021-08-03 LAB — SARS-COV-2 RNA SPEC QL NAA+PROBE: SIGNIFICANT CHANGE UP

## 2021-08-03 PROCEDURE — 71046 X-RAY EXAM CHEST 2 VIEWS: CPT | Mod: 26

## 2021-08-03 PROCEDURE — 71275 CT ANGIOGRAPHY CHEST: CPT | Mod: 26,MA

## 2022-03-23 ENCOUNTER — EMERGENCY (EMERGENCY)
Facility: HOSPITAL | Age: 32
LOS: 0 days | Discharge: HOME | End: 2022-03-23
Attending: EMERGENCY MEDICINE
Payer: MEDICAID

## 2022-03-23 VITALS
RESPIRATION RATE: 20 BRPM | OXYGEN SATURATION: 95 % | SYSTOLIC BLOOD PRESSURE: 142 MMHG | DIASTOLIC BLOOD PRESSURE: 96 MMHG | HEART RATE: 107 BPM | TEMPERATURE: 98 F

## 2022-03-23 VITALS — HEIGHT: 67 IN | WEIGHT: 169.98 LBS

## 2022-03-23 DIAGNOSIS — L29.9 PRURITUS, UNSPECIFIED: ICD-10-CM

## 2022-03-23 DIAGNOSIS — L23.7 ALLERGIC CONTACT DERMATITIS DUE TO PLANTS, EXCEPT FOOD: ICD-10-CM

## 2022-03-23 DIAGNOSIS — R21 RASH AND OTHER NONSPECIFIC SKIN ERUPTION: ICD-10-CM

## 2022-03-23 PROCEDURE — 99283 EMERGENCY DEPT VISIT LOW MDM: CPT

## 2022-03-23 RX ADMIN — Medication 60 MILLIGRAM(S): at 10:32

## 2022-03-23 NOTE — ED STATDOCS - CLINICAL SUMMARY MEDICAL DECISION MAKING FREE TEXT BOX
appears consistent with contact dermatitis likely from poison ivy. will provide steroids and encourage supportive care and discharge home with return precautions. appears consistent with contact dermatitis likely from poison ivy. will provide steroids and encourage supportive care and discharge home with return precautions.  No red flags for dangerous etiology of skin rash.  No e/o sjs or ten.  D/c home with strict return precautions and prompt outpatient f/u.

## 2022-03-23 NOTE — ED STATDOCS - PATIENT PORTAL LINK FT
You can access the FollowMyHealth Patient Portal offered by [facility] by registering at the following website: [email or web address]. By joining [employer]’s FollowMyHealth portal, you will also be able to view your health information using other applications (apps) compatible with our system.

## 2022-03-23 NOTE — ED STATDOCS - OBJECTIVE STATEMENT
32 M denies hx here c/o rash to b/l forearms and abdomen in setting of poison ivy contact 2 days ago. no difficulty breathing. no fever or chills. reports feeling nauseous. states that his forearms feel very tight and sore.  pt has been using a topical lotion to relieve symptoms. NKDA. 1 Principal Discharge DX:	Cervical radiculopathy

## 2022-03-23 NOTE — ED STATDOCS - NSFOLLOWUPINSTRUCTIONS_ED_ALL_ED_FT
Poison Ivy Dermatitis      Poison ivy dermatitis is inflammation of the skin that is caused by chemicals in the leaves of the poison ivy plant. The skin reaction often involves redness, swelling, blisters, and extreme itching.      What are the causes?    This condition is caused by a chemical (urushiol) found in the sap of the poison ivy plant. This chemical is sticky and can be easily spread to people, animals, and objects. You can get poison ivy dermatitis by:  •Having direct contact with a poison ivy plant.      •Touching animals, other people, or objects that have come in contact with poison ivy and have the chemical on them.        What increases the risk?    This condition is more likely to develop in people who:  •Are outdoors often in wooded or marshy areas.      •Go outdoors without wearing protective clothing, such as closed shoes, long pants, and a long-sleeved shirt.        What are the signs or symptoms?     Symptoms of this condition include:  •Redness of the skin.      •Extreme itching.      •A rash that often includes bumps and blisters. The rash usually appears 48 hours after exposure, if you have been exposed before. If this is the first time you have been exposed, the rash may not appear until a week after exposure.      •Swelling. This may occur if the reaction is more severe.      Symptoms usually last for 1–2 weeks. However, the first time you develop this condition, symptoms may last 3–4 weeks.      How is this diagnosed?    This condition may be diagnosed based on your symptoms and a physical exam. Your health care provider may also ask you about any recent outdoor activity.      How is this treated?    Treatment for this condition will vary depending on how severe it is. Treatment may include:  •Hydrocortisone cream or calamine lotion to relieve itching.      •Oatmeal baths to soothe the skin.      •Medicines, such as over-the-counter antihistamine tablets.      •Oral steroid medicine, for more severe reactions.        Follow these instructions at home:    Medicines     •Take or apply over-the-counter and prescription medicines only as told by your health care provider.      •Use hydrocortisone cream or calamine lotion as needed to soothe the skin and relieve itching.      General instructions     • Do not scratch or rub your skin.      •Apply a cold, wet cloth (cold compress) to the affected areas or take baths in cool water. This will help with itching. Avoid hot baths and showers.      •Take oatmeal baths as needed. Use colloidal oatmeal. You can get this at your local pharmacy or grocery store. Follow the instructions on the packaging.      •While you have the rash, wash clothes right after you wear them.      •Keep all follow-up visits as told by your health care provider. This is important.        How is this prevented?     •Learn to identify the poison ivy plant and avoid contact with the plant. This plant can be recognized by the number of leaves. Generally, poison ivy has three leaves with flowering branches on a single stem. The leaves are typically glossy, and they have jagged edges that come to a point at the front.      •If you have been exposed to poison ivy, thoroughly wash with soap and water right away. You have about 30 minutes to remove the plant resin before it will cause the rash. Be sure to wash under your fingernails, because any plant resin there will continue to spread the rash.      •When hiking or camping, wear clothes that will help you to avoid exposure on the skin. This includes long pants, a long-sleeved shirt, tall socks, and hiking boots. You can also apply preventive lotion to your skin to help limit exposure.      •If you suspect that your clothes or outdoor gear came in contact with poison ivy, rinse them off outside with a garden hose before you bring them inside your house.      •When doing yard work or gardening, wear gloves, long sleeves, long pants, and boots. Wash your garden tools and gloves if they come in contact with poison ivy.      •If you suspect that your pet has come into contact with poison ivy, wash him or her with pet shampoo and water. Make sure to wear gloves while washing your pet.        Contact a health care provider if you have:    •Open sores in the rash area.      •More redness, swelling, or pain in the affected area.      •Redness that spreads beyond the rash area.      •Fluid, blood, or pus coming from the affected area.      •A fever.      •A rash over a large area of your body.      •A rash on your eyes, mouth, or genitals.      •A rash that does not improve after a few weeks.        Get help right away if:    •Your face swells or your eyes swell shut.      •You have trouble breathing.      •You have trouble swallowing.      These symptoms may represent a serious problem that is an emergency. Do not wait to see if the symptoms will go away. Get medical help right away. Call your local emergency services (911 in the [place] Do not drive yourself to the hospital.       Summary    •Poison ivy dermatitis is inflammation of the skin that is caused by chemicals in the leaves of the poison ivy plant.      •Symptoms of this condition include redness, itching, a rash, and swelling.      • Do not scratch or rub your skin.      •Take or apply over-the-counter and prescription medicines only as told by your health care provider.

## 2022-03-23 NOTE — ED STATDOCS - PROGRESS NOTE DETAILS
33 yo male presents with allergic reaction to poison ivy. Pt was taking off juan antonio 2 days ago and developed a reaction mainly to his b/l arms. +pruritus but has been using OTC poison ivy and poison oak cream with relief. Denies fever, trouble breathing. Rash including blisters noted to the b/l arms. Excoriation noted (however, pt states that was from wripping off branches). 0Discussed plan with pt including OTC benadryl to help with itching (and to not combine any benadryl cream with OTC oral benadryl) and a course of prednisone to help with symptoms. -Rayo Serrato PA-C

## 2022-03-23 NOTE — ED STATDOCS - CARE PLAN
Principal Discharge DX:	Poison ivy dermatitis  Secondary Diagnosis:	Rash  Secondary Diagnosis:	Pruritus   1

## 2022-10-15 ENCOUNTER — EMERGENCY (EMERGENCY)
Facility: HOSPITAL | Age: 32
LOS: 0 days | Discharge: HOME | End: 2022-10-15
Attending: EMERGENCY MEDICINE
Payer: MEDICAID

## 2022-10-15 VITALS
TEMPERATURE: 98 F | DIASTOLIC BLOOD PRESSURE: 93 MMHG | SYSTOLIC BLOOD PRESSURE: 143 MMHG | OXYGEN SATURATION: 97 % | RESPIRATION RATE: 16 BRPM | HEART RATE: 79 BPM

## 2022-10-15 VITALS — HEIGHT: 67 IN | WEIGHT: 175.05 LBS
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
## 2022-10-15 DIAGNOSIS — S93.501A UNSPECIFIED SPRAIN OF RIGHT GREAT TOE, INITIAL ENCOUNTER: ICD-10-CM

## 2022-10-15 DIAGNOSIS — M25.571 PAIN IN RIGHT ANKLE AND JOINTS OF RIGHT FOOT: ICD-10-CM
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
## 2022-10-15 DIAGNOSIS — S93.401A SPRAIN OF UNSPECIFIED LIGAMENT OF RIGHT ANKLE, INITIAL ENCOUNTER: ICD-10-CM

## 2022-10-15 PROCEDURE — 73620 X-RAY EXAM OF FOOT: CPT | Mod: RT

## 2022-10-15 PROCEDURE — 73610 X-RAY EXAM OF ANKLE: CPT | Mod: 26,RT

## 2022-10-15 PROCEDURE — 99283 EMERGENCY DEPT VISIT LOW MDM: CPT

## 2022-10-15 PROCEDURE — 73610 X-RAY EXAM OF ANKLE: CPT | Mod: RT

## 2022-10-15 PROCEDURE — 99284 EMERGENCY DEPT VISIT MOD MDM: CPT | Mod: 25

## 2022-10-15 PROCEDURE — 73620 X-RAY EXAM OF FOOT: CPT | Mod: 26,RT

## 2022-10-15 RX ORDER — IBUPROFEN 200 MG
600 TABLET ORAL ONCE
Refills: 0 | Status: COMPLETED | OUTPATIENT
Start: 2022-10-15 | End: 2022-10-15

## 2022-10-15 RX ADMIN — Medication 600 MILLIGRAM(S): at 12:34

## 2022-10-15 NOTE — ED STATDOCS - ATTENDING APP SHARED VISIT CONTRIBUTION OF CARE
I, Radha Jo MD, personally saw the patient with ACP.  I have personally performed a face to face diagnostic evaluation on this patient.  I have reviewed the ACP note and agree with the history, exam, and plan of care, except as noted.  I personally saw the patient and performed a substantive portion of the visit including all aspects of the medical decision making.

## 2022-10-15 NOTE — ED STATDOCS - PHYSICAL EXAMINATION
Constitutional: NAD AAOx3  Eyes: PERRL, EOMI  Head: Normocephalic atraumatic  Mouth: MMM  Cardiac: regular rate   Resp: Lungs CTAB  GI: Abd s/nt/nd  Neuro: CN2-12 intact  Extremities: Intact distal pulses b/l, no calf tenderness, normal ROM b/l UE. swelling and bruising to the lateral aspect of the foot  Skin: No rashes Attending: Constitutional: NAD AAOx3  Eyes: PERRL, EOMI  Head: Normocephalic atraumatic  Mouth: MMM  Cardiac: regular rate   Resp: Lungs CTAB  GI: Abd s/nt/nd  Neuro: CN2-12 intact  Extremities: Intact distal pulses b/l, no calf tenderness, normal ROM b/l UE. swelling and bruising to the lateral aspect of the foot  Skin: No rashes

## 2022-10-15 NOTE — ED STATDOCS - PATIENT PORTAL LINK FT
You can access the FollowMyHealth Patient Portal offered by [facility] by registering at the following website: [email or web address]. By joining [employer]’s FollowMyHealth portal, you will also be able to view your health information using other applications (apps) compatible with our system.

## 2022-10-15 NOTE — ED STATDOCS - CPE ED NEURO NORM
Quality 110: Preventive Care And Screening: Influenza Immunization: Influenza Immunization previously received during influenza season
Detail Level: Detailed
normal...

## 2022-10-15 NOTE — ED STATDOCS - MUSCULOSKELETAL, MLM
range of motion is not limited and there is no muscle tenderness. RIGHT ANKLE: +Mild diffuse tenderness. FROM. No deformity. 2+ distal pulses.

## 2022-10-15 NOTE — ED STATDOCS - NS ED ROS FT
Constitutional: No fever or chills  Eyes: No visual changes  HEENT: No throat pain  CV: No chest pain  Resp: No SOB no cough  GI: No abd pain, nausea or vomiting  : No dysuria  MSK: +R ankle pain  Skin: No rash  Neuro: No headache

## 2022-10-15 NOTE — ED STATDOCS - OBJECTIVE STATEMENT
33 y/o M with no pertinent PMhx of presents to the ED c/o R ankle pain. pt states he twisted his ankle when he stepped in a hole at work.

## 2022-10-15 NOTE — ED STATDOCS - NS ED ATTENDING STATEMENT MOD
This was a shared visit with the [facility]. I reviewed and verified the documentation and independently performed the documented:

## 2022-10-15 NOTE — ED STATDOCS - PROGRESS NOTE DETAILS
PA note: All radiology results discussed in detail with patient. Patient re-examined and re-evaluated. Patient feels much better at this time. ED evaluation, Diagnosis and management discussed with the patient in detail. Workup results discussed with ED attending, [place] to [place] home. Close ORTHO MD follow up encouraged, aftercare to assist with scheduling appointment ASAP. Strict ED return instructions discussed in detail and patient given the opportunity to ask any questions about their discharge diagnosis and instructions. Patient verbalized understanding. ~ Milton Aguila PA-C PA: Patient is a 31 y/o M with no pertinent PMHx who presents to [facility] c/o R ankle pain after twisting it at work after he stepped into a hole. ~Milton Aguila PA-C PA note: ACE + Aircast ankle splint applied. All radiology results discussed in detail with patient. Patient re-examined and re-evaluated. Patient feels much better at this time. ED evaluation, Diagnosis and management discussed with the patient in detail. Workup results discussed with ED attending, [place] to [place] home. Close ORTHO MD follow up encouraged, aftercare to assist with scheduling appointment ASAP. Strict ED return instructions discussed in detail and patient given the opportunity to ask any questions about their discharge diagnosis and instructions. Patient verbalized understanding. ~ Milton Aguila PA-C

## 2022-10-15 NOTE — ED STATDOCS - NSFOLLOWUPINSTRUCTIONS_ED_ALL_ED_FT
Ankle Sprain    Illustration of an ankle sprain caused by a foot turning outward and a foot turning inward.    An ankle sprain is a stretch or tear in a ligament in the ankle. Ligaments are tissues that connect bones to each other.    The two most common types of ankle sprains are:  •Inversion sprain. This happens when the foot turns inward and the ankle rolls outward. It affects the ligament on the outside of the foot (lateral ligament).      •Eversion sprain. This happens when the foot turns outward and the ankle rolls inward. It affects the ligament on the inner side of the foot (medial ligament).        What are the causes?    This condition is often caused by accidentally rolling or twisting the ankle.      What increases the risk?    You are more likely to develop this condition if you play sports.      What are the signs or symptoms?  Comparison of a normal ankle and an ankle that is swollen and bruised.   Symptoms of this condition include:  •Pain in your ankle.      •Swelling.      •Bruising. This may develop right after you sprain your ankle or 1–2 days later.      •Trouble standing or walking, especially when you turn or change directions.        How is this diagnosed?    This condition is diagnosed with:  •A physical exam. During the exam, your health care provider will press on certain parts of your foot and ankle and try to move them in certain ways.      •X-ray imaging. These may be taken to see how severe the sprain is and to check for broken bones.        How is this treated?    This condition may be treated with:  •A brace or splint. This is used to keep the ankle from moving until it heals.      •An elastic bandage. This is used to support the ankle.      •Crutches.      •Pain medicine.      •Surgery. This may be needed if the sprain is severe.      •Physical therapy. This may help to improve the range of motion in the ankle.        Follow these instructions at home:    If you have a brace or a splint:     •Wear the brace or splint as told by your health care provider. Remove it only as told by your health care provider.      •Loosen the brace or splint if your toes tingle, become numb, or turn cold and blue.      •Keep the brace or splint clean.    •If the brace or splint is not waterproof:  •Do not let it get wet.       •Cover it with a watertight covering when you take a bath or a shower.        If you have an elastic bandage (dressing):     •Remove it to shower or bathe.      •Try not to move your ankle much, but wiggle your toes from time to time. This helps to prevent swelling.      •Adjust the dressing to make it more comfortable if it feels too tight.      •Loosen the dressing if you have numbness or tingling in your foot, or if your foot becomes cold and blue.        Managing pain, stiffness, and swelling   A bag of ice on a towel on the skin.   •Take over-the-counter and prescription medicines only as told by your health care provider.      •For 2–3 days, keep your ankle raised (elevated) above the level of your heart as much as possible.    •If directed, put ice on the injured area:  •If you have a removable brace or splint, remove it as told by your health care provider.      •Put ice in a plastic bag.      •Place a towel between your skin and the bag.      •Leave the ice on for 20 minutes, 2–3 times a day.        General instructions     •Rest your ankle.      • Do not use the injured limb to support your body weight until your health care provider says that you can. Use crutches as told by your health care provider.      • Do not use any products that contain nicotine or tobacco, such as cigarettes, e-cigarettes, and chewing tobacco. If you need help quitting, ask your health care provider.      •Keep all follow-up visits as told by your health care provider. This is important.        Contact a health care provider if:    •You have rapidly increasing bruising or swelling.      •Your pain is not relieved with medicine.        Get help right away if:    •Your foot or toes become numb or blue.      •You have severe pain that gets worse.        Summary    •An ankle sprain is a stretch or tear in a ligament in the ankle. Ligaments are tissues that connect bones to each other.      •This condition is often caused by accidentally rolling or twisting the ankle.      •Symptoms include pain, swelling, bruising, and trouble walking.      •To relieve pain and swelling, put ice on the affected ankle, raise your ankle above the level of your heart, and use an elastic bandage.      •Keep all follow-up visits as told by your health care provider. This is important.      This information is not intended to replace advice given to you by your health care provider. Make sure you discuss any questions you have with your health care provider.                                                                                                               Foot Sprain       A foot sprain is an injury to one of the ligaments in the feet. Ligaments are strong tissues that connect bones to each other. The ligament can be stretched too much. In some cases, it may tear. A tear can be either partial or complete. The severity of the sprain depends on how much of the ligament was damaged or torn.      What are the causes?    This condition is usually caused by suddenly twisting or pivoting your foot.      What increases the risk?    You are more likely to develop this condition if:  •You play a sport, such as basketball or football.      •You exercise or play a sport without first warming up your muscles.      •You start a new workout or sport.      •You suddenly increase how long or hard you exercise or play a sport.      •You have injured your foot or ankle before.        What are the signs or symptoms?    Symptoms of this condition start soon after an injury and include:  •Pain, especially in the arch of your foot.      •Bruising.      •Swelling.      •Being unable to walk or use your foot to support body weight.        How is this diagnosed?    This condition is diagnosed with a medical history and physical exam. You may also have imaging tests, such as:  •X-rays to check for broken bones (fractures).      •An MRI to see if the ligament is torn.        How is this treated?    Treatment for this condition depends on the severity of the sprain. Mild sprains and major sprains can be treated with:  •Rest, ice, pressure (compression), and elevation (RICE). Elevation means raising your injured foot.      •Keeping your foot in a fixed position (immobilization) for a period of time. This is done if your ligament is overstretched or partially torn. Your health care provider will apply a bandage, splint, or walking boot to keep your foot from moving until it heals.      •Using crutches or a scooter for a few weeks to avoid bearing weight on your foot while it is healing.      •Physical therapy exercises to improve movement and strength in your foot.      Major sprains may also be treated with:  •Surgery. This is done if your ligament is fully torn and a procedure is needed to reconnect it to the bone.      •A cast or splint. This will be needed after surgery. A cast or splint will need to stay on your foot while it heals.        Follow these instructions at home:    If you have a bandage, splint, or boot:     •Wear it as told by your health care provider. Remove it only as told by your health care provider.      •Loosen it if your toes tingle, become numb, or turn cold and blue.      •Keep it clean and dry.      If you have a cast:     • Do not put pressure on any part of the cast until it is fully hardened. This may take several hours.      • Do not stick anything inside the cast to scratch your skin. Doing that increases your risk for infection.      •Check the skin around the cast every day. Tell your health care provider about any concerns.      •You may put lotion on dry skin around the edges of the cast. Do not put lotion on the skin underneath the cast.      •Keep it clean and dry.      Bathing     • Do not take baths, swim, or use a hot tub until your health care provider approves. Ask your health care provider if you may take showers. You may only be allowed to take sponge baths.    •If the bandage, splint, boot, or cast is not waterproof:  •Do not let it get wet.      •Cover it with a watertight covering when you take a bath or shower.          Managing pain, stiffness, and swelling    •If directed, put ice on the injured area. To do this:  •If you have a removable bandage, splint, or boot, remove it as told by your health care provider.      •Put ice in a plastic bag.      •Place a towel between your skin and the bag, or between your cast and the bag.      •Leave the ice on for 20 minutes, 2–3 times per day.      •Remove the ice if your skin turns bright red. This is very important. If you cannot feel pain, heat, or cold, you have a greater risk of damage to the area.        •Move your toes often to reduce stiffness and swelling.      •Elevate the injured area above the level of your heart while you are sitting or lying down.      Activity     • Do not use the injured foot to support your body weight until your health care provider says that you can. Use crutches or a scooter as told by your health care provider.      •Ask your health care provider what activities are safe for you. Do exercises as told by your health care provider.      •Gradually increase how much and how far you walk until your health care provider says it is safe to return to full activity.      Driving     •Ask your health care provider if the medicine prescribed to you requires you to avoid driving or using machinery.      •Ask your health care provider when it is safe to drive if you have a bandage, splint, boot, or cast on your foot.      General instructions     •Take over-the-counter and prescription medicines only as told by your health care provider.      •When you can walk without pain, wear supportive shoes that have stiff soles. Do not wear flip-flops. Do not walk barefoot.      •Keep all follow-up visits. This is important.        Contact a health care provider if:    •Medicine does not help your pain.      •Your bruising or swelling gets worse or does not get better with treatment.      •Your splint, boot, or cast is damaged.        Get help right away if:    •You develop severe numbness or tingling in your foot.      •Your foot turns blue, white, or gray, and it feels cold.        Summary    •A foot sprain is an injury to one of the ligaments in the feet. Ligaments are strong tissues that connect bones to each other.      •You may need a bandage, splint, boot, or cast to support your foot while it heals. Sometimes, surgery may be needed.      •You may need physical therapy exercises to improve movement and strength in your foot.      This information is not intended to replace advice given to you by your health care provider. Make sure you discuss any questions you have with your health care provider.

## 2022-10-15 NOTE — ED STATDOCS - CLINICAL SUMMARY MEDICAL DECISION MAKING FREE TEXT BOX
Imaging to be obtained. Imaging to be obtained.    PA note: All radiology results discussed in detail with patient. Patient re-examined and re-evaluated. Patient feels much better at this time. ED evaluation, Diagnosis and management discussed with the patient in detail. Workup results discussed with ED attending, [place] to [place] home. Close ORTHO MD follow up encouraged, aftercare to assist with scheduling appointment ASAP. Strict ED return instructions discussed in detail and patient given the opportunity to ask any questions about their discharge diagnosis and instructions. Patient verbalized understanding. ~ Milton Aguila PA-C Imaging to be obtained.    PA note: ACE + Aircast ankle splint applied. All radiology results discussed in detail with patient. Patient re-examined and re-evaluated. Patient feels much better at this time. ED evaluation, Diagnosis and management discussed with the patient in detail. Workup results discussed with ED attending, [place] to [place] home. Close ORTHO MD follow up encouraged, aftercare to assist with scheduling appointment ASAP. Strict ED return instructions discussed in detail and patient given the opportunity to ask any questions about their discharge diagnosis and instructions. Patient verbalized understanding. ~ Milton Aguila PA-C

## 2022-10-15 NOTE — ED STATDOCS - CARE PROVIDER_API CALL
Devaughn Aly (DO)  Orthopaedic Surgery  [address] [place] [address]  Phone: [phone]  Fax: [phone]  Follow Up Time: Urgent

## 2022-10-21 NOTE — ED POST DISCHARGE NOTE - REASON FOR FOLLOW-UP
Other message sheet found from Date 10/18/2022 needs letter for work. PA called twice and left messages, I called today no answer, Left a work note in triage for work. SONAL TAYLOR

## 2022-11-13 ENCOUNTER — INPATIENT (INPATIENT)
Facility: HOSPITAL | Age: 32
LOS: 9 days | Discharge: HOME | DRG: 885 | End: 2022-11-23
Attending: PSYCHIATRY & NEUROLOGY | Admitting: PSYCHIATRY & NEUROLOGY
Payer: MEDICAID

## 2022-11-13 VITALS — WEIGHT: 171.96 LBS | HEIGHT: 67 IN

## 2022-11-13 DIAGNOSIS — F29 UNSPECIFIED PSYCHOSIS NOT DUE TO A SUBSTANCE OR KNOWN PHYSIOLOGICAL CONDITION: ICD-10-CM

## 2022-11-13 LAB
ALBUMIN SERPL ELPH-MCNC: 4.3 G/DL — SIGNIFICANT CHANGE UP (ref 3.3–5)
ALP SERPL-CCNC: 51 U/L — SIGNIFICANT CHANGE UP (ref 40–120)
ALT FLD-CCNC: 15 U/L — SIGNIFICANT CHANGE UP (ref 12–78)
ANION GAP SERPL CALC-SCNC: 4 MMOL/L — LOW (ref 5–17)
APAP SERPL-MCNC: < 2 UG/ML (ref 10–30)
APPEARANCE UR: CLEAR — SIGNIFICANT CHANGE UP
AST SERPL-CCNC: 6 U/L — LOW (ref 15–37)
BASOPHILS # BLD AUTO: 0.03 K/UL — SIGNIFICANT CHANGE UP (ref 0–0.2)
BASOPHILS NFR BLD AUTO: 0.3 % — SIGNIFICANT CHANGE UP (ref 0–2)
BILIRUB SERPL-MCNC: 0.5 MG/DL — SIGNIFICANT CHANGE UP (ref 0.2–1.2)
BILIRUB UR-MCNC: NEGATIVE — SIGNIFICANT CHANGE UP
BUN SERPL-MCNC: 13 MG/DL — SIGNIFICANT CHANGE UP (ref 7–23)
CALCIUM SERPL-MCNC: 9.4 MG/DL — SIGNIFICANT CHANGE UP (ref 8.5–10.1)
CHLORIDE SERPL-SCNC: 108 MMOL/L — SIGNIFICANT CHANGE UP (ref 96–108)
CO2 SERPL-SCNC: 30 MMOL/L — SIGNIFICANT CHANGE UP (ref 22–31)
COLOR SPEC: YELLOW — SIGNIFICANT CHANGE UP
COVID-19 SPIKE DOMAIN AB INTERP: NEGATIVE — SIGNIFICANT CHANGE UP
COVID-19 SPIKE DOMAIN ANTIBODY RESULT: 0.4 U/ML — SIGNIFICANT CHANGE UP
CREAT SERPL-MCNC: 0.88 MG/DL — SIGNIFICANT CHANGE UP (ref 0.5–1.3)
DIFF PNL FLD: NEGATIVE — SIGNIFICANT CHANGE UP
EGFR: 119 ML/MIN/1.73M2 — SIGNIFICANT CHANGE UP
EOSINOPHIL # BLD AUTO: 0.05 K/UL — SIGNIFICANT CHANGE UP (ref 0–0.5)
EOSINOPHIL NFR BLD AUTO: 0.5 % — SIGNIFICANT CHANGE UP (ref 0–6)
ETHANOL SERPL-MCNC: <10 MG/DL — SIGNIFICANT CHANGE UP (ref 0–10)
GLUCOSE SERPL-MCNC: 114 MG/DL — HIGH (ref 70–99)
GLUCOSE UR QL: NEGATIVE — SIGNIFICANT CHANGE UP
HCT VFR BLD CALC: 43.8 % — SIGNIFICANT CHANGE UP (ref 39–50)
HGB BLD-MCNC: 15.1 G/DL — SIGNIFICANT CHANGE UP (ref 13–17)
IMM GRANULOCYTES NFR BLD AUTO: 0.2 % — SIGNIFICANT CHANGE UP (ref 0–0.9)
KETONES UR-MCNC: ABNORMAL
LEUKOCYTE ESTERASE UR-ACNC: NEGATIVE — SIGNIFICANT CHANGE UP
LYMPHOCYTES # BLD AUTO: 1.62 K/UL — SIGNIFICANT CHANGE UP (ref 1–3.3)
LYMPHOCYTES # BLD AUTO: 17.1 % — SIGNIFICANT CHANGE UP (ref 13–44)
MCHC RBC-ENTMCNC: 30.8 PG — SIGNIFICANT CHANGE UP (ref 27–34)
MCHC RBC-ENTMCNC: 34.5 GM/DL — SIGNIFICANT CHANGE UP (ref 32–36)
MCV RBC AUTO: 89.2 FL — SIGNIFICANT CHANGE UP (ref 80–100)
MONOCYTES # BLD AUTO: 0.41 K/UL — SIGNIFICANT CHANGE UP (ref 0–0.9)
MONOCYTES NFR BLD AUTO: 4.3 % — SIGNIFICANT CHANGE UP (ref 2–14)
NEUTROPHILS # BLD AUTO: 7.32 K/UL — SIGNIFICANT CHANGE UP (ref 1.8–7.4)
NEUTROPHILS NFR BLD AUTO: 77.6 % — HIGH (ref 43–77)
NITRITE UR-MCNC: NEGATIVE — SIGNIFICANT CHANGE UP
PCP SPEC-MCNC: SIGNIFICANT CHANGE UP
PH UR: 6 — SIGNIFICANT CHANGE UP (ref 5–8)
PLATELET # BLD AUTO: 310 K/UL — SIGNIFICANT CHANGE UP (ref 150–400)
POTASSIUM SERPL-MCNC: 3.7 MMOL/L — SIGNIFICANT CHANGE UP (ref 3.5–5.3)
POTASSIUM SERPL-SCNC: 3.7 MMOL/L — SIGNIFICANT CHANGE UP (ref 3.5–5.3)
PROT SERPL-MCNC: 7.6 GM/DL — SIGNIFICANT CHANGE UP (ref 6–8.3)
PROT UR-MCNC: NEGATIVE — SIGNIFICANT CHANGE UP
RBC # BLD: 4.91 M/UL — SIGNIFICANT CHANGE UP (ref 4.2–5.8)
RBC # FLD: 11.8 % — SIGNIFICANT CHANGE UP (ref 10.3–14.5)
SALICYLATES SERPL-MCNC: <1.7 MG/DL — LOW (ref 2.8–20)
SARS-COV-2 IGG+IGM SERPL QL IA: 0.4 U/ML — SIGNIFICANT CHANGE UP
SARS-COV-2 IGG+IGM SERPL QL IA: NEGATIVE — SIGNIFICANT CHANGE UP
SARS-COV-2 RNA SPEC QL NAA+PROBE: SIGNIFICANT CHANGE UP
SODIUM SERPL-SCNC: 142 MMOL/L — SIGNIFICANT CHANGE UP (ref 135–145)
SP GR SPEC: 1.01 — SIGNIFICANT CHANGE UP (ref 1.01–1.02)
TSH SERPL-MCNC: 1.83 UU/ML — SIGNIFICANT CHANGE UP (ref 0.34–4.82)
UROBILINOGEN FLD QL: NEGATIVE — SIGNIFICANT CHANGE UP
WBC # BLD: 9.45 K/UL — SIGNIFICANT CHANGE UP (ref 3.8–10.5)
WBC # FLD AUTO: 9.45 K/UL — SIGNIFICANT CHANGE UP (ref 3.8–10.5)

## 2022-11-13 PROCEDURE — 93010 ELECTROCARDIOGRAM REPORT: CPT

## 2022-11-13 PROCEDURE — 80061 LIPID PANEL: CPT

## 2022-11-13 PROCEDURE — 70450 CT HEAD/BRAIN W/O DYE: CPT | Mod: 26,MA

## 2022-11-13 PROCEDURE — 99285 EMERGENCY DEPT VISIT HI MDM: CPT

## 2022-11-13 PROCEDURE — 81003 URINALYSIS AUTO W/O SCOPE: CPT

## 2022-11-13 PROCEDURE — 83036 HEMOGLOBIN GLYCOSYLATED A1C: CPT

## 2022-11-13 PROCEDURE — 99252 IP/OBS CONSLTJ NEW/EST SF 35: CPT

## 2022-11-13 PROCEDURE — 80307 DRUG TEST PRSMV CHEM ANLYZR: CPT

## 2022-11-13 PROCEDURE — U0003: CPT

## 2022-11-13 PROCEDURE — U0005: CPT

## 2022-11-13 PROCEDURE — 36415 COLL VENOUS BLD VENIPUNCTURE: CPT

## 2022-11-13 PROCEDURE — 84443 ASSAY THYROID STIM HORMONE: CPT

## 2022-11-13 RX ORDER — RISPERIDONE 4 MG/1
0.5 TABLET ORAL
Refills: 0 | Status: DISCONTINUED | OUTPATIENT
Start: 2022-11-13 | End: 2022-11-15

## 2022-11-13 RX ORDER — HALOPERIDOL DECANOATE 100 MG/ML
5 INJECTION INTRAMUSCULAR EVERY 6 HOURS
Refills: 0 | Status: DISCONTINUED | OUTPATIENT
Start: 2022-11-13 | End: 2022-11-23

## 2022-11-13 RX ORDER — ACETAMINOPHEN 500 MG
650 TABLET ORAL ONCE
Refills: 0 | Status: COMPLETED | OUTPATIENT
Start: 2022-11-13 | End: 2022-11-13

## 2022-11-13 RX ORDER — HALOPERIDOL DECANOATE 100 MG/ML
2 INJECTION INTRAMUSCULAR EVERY 8 HOURS
Refills: 0 | Status: DISCONTINUED | OUTPATIENT
Start: 2022-11-13 | End: 2022-11-23

## 2022-11-13 NOTE — ED ADULT NURSE NOTE - CHIEF COMPLAINT QUOTE
PT bib brother due to having auditory hallucinations x2 months that are worsening PT was noted to be screaming by family in the middle of the night saying "go away". Brother reports PT locked himself in his room and brother had to break into room. Brother reports PT having negative thoughts and is concerned for suicidal thoughts. PT denies SI/HI in triage. PT states he is here because he has a headache. PT placed on 1:1

## 2022-11-13 NOTE — ED ADULT NURSE NOTE - OBJECTIVE STATEMENT
Patient BIB brother from home due to having auditory hallucinations x2 months that are progressively getting worse. Patient was noted to be screaming by family in the middle of the night saying "go away". Brother reports PT locked himself in his room and brother had to break into room. Brother reports PT having negative thoughts and is concerned for suicidal thoughts. PT denies SI/HI in triage. PT states he is here because he has a headache. PT placed on 1:1

## 2022-11-13 NOTE — H&P ADULT - HISTORY OF PRESENT ILLNESS
33 yo M no significant PMH admitted to [facility] for Mx of Bizarre behavior at home. Pt is still acting bizarre during interview. Pt is constantly discussing with Nurse about going home and when can he go home?. As per psych EMR, Pt's mother reported that pt was acting bizarre at home, screaming at times, not sleeping, poor eating habits, pt has been talking to himself barricade himself in the room.  Pt denies headache, dizziness, chest pain, palpitations or SOB. Pt denies fever chills, cough, dysuria or diarrhea. Medicine consult is called for medical Mx. Pt was seen and examined at bedside with Nursing Assistant.     PMH: As  above  PSH: Pt denies any surgery in the past   Social Hx: Pt denies smoking cigs. Pt denies drinking alcohol. Utox: Negative   Family Hx: Pt denies any family Hx of CAD, DM, HTN or HLD.

## 2022-11-13 NOTE — H&P ADULT - NSHPOUTPATIENTPROVIDERS_GEN_ALL_CORE
[address] [facility] ED  eMERGENCYdEPARTMENT eNCOUnter      Pt Name: Jonathon Hammonds  MRN: [id number]  [place] 1955  Date of evaluation: 8/15/2021  Madhuri Morales MD    CHIEF COMPLAINT           HPI  Jonathon Hammonds is a 63 y.o. female per chart review has a h/o DM II, HTn, hpl, JESICA, PVD, depression/anxiety, schizophrenia presents to the ED with ab pain. Pt notes gradual onset, severe, constant, aching, epigastric ab pain since this am.  Pt denies fever, n/v, cp, sob, dysuria, diarrhea. ROS  Review of Systems   Constitutional: Negative for activity change, chills and fever. HENT: Negative for ear pain and sore throat. Eyes: Negative for visual disturbance. Respiratory: Negative for cough and shortness of breath. Cardiovascular: Negative for chest pain, palpitations and leg swelling. Gastrointestinal: Positive for abdominal pain. Negative for diarrhea, nausea and vomiting. Genitourinary: Negative for dysuria. Musculoskeletal: Negative for back pain. Skin: Negative for rash. Neurological: Negative for dizziness and weakness. Except as noted above the remainder of the review of systems was reviewed and negative.        PAST MEDICAL HISTORY     Past Medical History:   Diagnosis Date    Asthma     CAD (coronary artery disease)     CKD (chronic kidney disease) stage 3, GFR 30-59 ml/min ([facility])     Colitis     Diabetes mellitus ([address]     Hyperlipidemia     Hypertension     PAD (peripheral artery disease) ([facility])     Prolonged emergence from general anesthesia     PVD [facility]peripheral vascular disease) [facility]     Thyroid disease          SURGICAL HISTORY       Past Surgical History:   Procedure Laterality Date    CARDIAC SURGERY      CATARACT REMOVAL WITH IMPLANT Bilateral 11- 11-     SECTION      COLONOSCOPY N/A 2019    COLONOSCOPY DIAGNOSTIC [facility] Ton Gu [facility] 06/2019    unknown vessels    HYSTERECTOMY      TOE AMPUTATION Right     3rd toe         CURRENTMEDICATIONS       Previous Medications    ALBUTEROL (PROVENTIL) (2.5 MG/3ML) 0.083% NEBULIZER SOLUTION    Take 3 mLs by nebulization every 6 hours as needed for Wheezing    ALBUTEROL SULFATE HFA (VENTOLIN HFA) 108 (90 BASE) MCG/ACT INHALER    Inhale 2 puffs into the lungs as needed for Wheezing Every 4-6 hours PRN    ALCOHOL SWABS (ALCOHOL PREP) 70 % PADS    qid    AMITRIPTYLINE (ELAVIL) 25 MG TABLET    Take 25 mg by mouth nightly    ARTIFICIAL TEARS 1.4 % OPHTHALMIC SOLUTION        ASPIRIN 81 MG EC TABLET    Take 1 tablet by mouth daily    ATORVASTATIN (LIPITOR) 40 MG TABLET    Take 2 tablets by mouth nightly    BLOOD GLUCOSE TEST STRIPS (FREESTYLE LITE) STRIP    1 each by In Vitro route daily As needed. CONTINUOUS BLOOD GLUC  (FREESTYLE FELY 14 DAY READER) CATHLEEN    As directed    CONTINUOUS BLOOD GLUC SENSOR (FREESTYLE FELY 14 DAY SENSOR) MISC    Every 2 weeks    CONTINUOUS BLOOD GLUC SENSOR (FREESTYLE FELY 14 DAY SENSOR) MISC    Every 2 weeks    CPAP MACHINE MISC    by Does not apply route New CPAP with 10 cm    CYANOCOBALAMIN (VITAMIN B-12) 1000 MCG EXTENDED RELEASE TABLET    Take 1,000 mcg by mouth daily    DOCUSATE SODIUM (COLACE, DULCOLAX) 100 MG CAPS    Take 100 mg by mouth 2 times daily    DULAGLUTIDE (TRULICITY) 8.59 KM/0.8AZ SOPN    Inject 0.75 mg into the skin once a week    EMOLLIENT (EUCERIN INTENSIVE REPAIR HAND) 2.5-10 % CREA    APPLY TO FEET AT BEDTIME; PLACE SOCKS OVER FEET AFTER APPLICATION IF NEEDED FOR DRY CRACKING FEET    FOLIC ACID (FOLVITE) 1 MG TABLET    Take 1 mg by mouth daily    FREESTYLE LANCETS MISC    1 each by Does not apply route daily    FUROSEMIDE (LASIX) 40 MG TABLET    Take 1 tablet by mouth daily    GABAPENTIN (NEURONTIN) 400 MG CAPSULE    Take 400 mg by mouth 2 times daily.  AM and 800 mg in pm-9pm    HALOPERIDOL (HALDOL) 5 MG TABLET    Take 5 mg by mouth daily HYDRALAZINE (APRESOLINE) 50 MG TABLET    Take 1 tablet by mouth every 8 hours    HYDROXYZINE (VISTARIL) 25 MG CAPSULE    Take 50 mg by mouth 3 times daily as needed     INSULIN GLARGINE (LANTUS SOLOSTAR) 100 UNIT/ML INJECTION PEN    50 units at bedtime    INSULIN GLARGINE (LANTUS) 100 UNIT/ML INJECTION VIAL    Inject 30 Units into the skin nightly    INSULIN LISPRO, 1 UNIT DIAL, (HUMALOG KWIKPEN) 100 UNIT/ML SOPN    15  units at each meals    INSULIN PEN NEEDLE ([employer] PEN NEEDLES 31G) 31G X 8 MM MISC    1 each by Does not apply route 4 times daily    INSULIN PEN NEEDLE (NOVOFINE) 32G X 6 MM MISC    qid    IRON POLYSACCH [id number] (NIFEREX-150 FORTE PO)    Take 1 tablet by mouth daily     ISOSORBIDE DINITRATE (ISORDIL) 20 MG TABLET    Take 1 tablet by mouth 3 times daily    LEVOTHYROXINE (SYNTHROID) 50 MCG TABLET    take 1 tablet by mouth once daily    MECLIZINE (ANTIVERT) 25 MG TABLET    Take 25 mg by mouth three times daily    METOPROLOL SUCCINATE (TOPROL XL) 50 MG EXTENDED RELEASE TABLET    Take 1 tablet by mouth 2 times daily    MONTELUKAST (SINGULAIR) 10 MG TABLET    Take 1 tablet by mouth nightly    NEOMYCIN-POLYMYXIN-DEXAMETH (MAXITROL) 3.5-13130-2.1 OPHTHALMIC SUSPENSION    instill 1 drop into both eyes four times a day    NITROGLYCERIN (NITROSTAT) 0.4 MG SL TABLET    up to max of 3 total doses. If no relief after 1 dose, call 911.     NUTRITIONAL SUPPLEMENTS (GLUCERNA SHAKE) LIQD    take as directed three times a day    OXYBUTYNIN (DITROPAN-XL) 5 MG EXTENDED RELEASE TABLET    take 1 tablet by mouth once daily    OXYGEN    2 lit O2 with sleep , please give O2 concentrator    PRAZOSIN (MINIPRESS) 2 MG CAPSULE    Take 2 mg by mouth nightly     RANOLAZINE (RANEXA) 500 MG EXTENDED RELEASE TABLET    Take 1 tablet by mouth 2 times daily    RESTASIS 0.05 % OPHTHALMIC EMULSION        SERTRALINE (ZOLOFT) 100 MG TABLET    Take 200 mg by mouth daily     TICAGRELOR (BRILINTA) 90 MG TABS TABLET    Take 90 mg by mouth 2 times daily       ALLERGIES     Ambien [zolpidem tartrate], Capoten [captopril], Clioquinol, Cogentin [benztropine], Depakote [divalproex sodium], Effexor xr [venlafaxine hcl er], Geodon [ziprasidone hcl], Lisinopril, Lyrica [pregabalin], Navane [thiothixene], Pamelor [nortriptyline hcl], Remeron [mirtazapine], Risperdal [risperidone], Trazodone and nefazodone, and Wellbutrin [bupropion]    FAMILY HISTORY     No family history on file. SOCIAL HISTORY       Social History     Socioeconomic History    Marital status:      Spouse name: Not on file    Number of children: Not on file    Years of education: Not on file    Highest education level: Not on file   Occupational History    Not on file   Tobacco Use    Smoking status: Never Smoker    Smokeless tobacco: Never Used   Vaping Use    Vaping Use: Never used   Substance and Sexual Activity    Alcohol use: Never    Drug use: Never    Sexual activity: Not on file   Other Topics Concern    Not on file   Social History Narrative         Lives With: Spouse    Type of Home: [address] [place] [address] [address]: One level    Home Access: Elevator    Bathroom Shower/Tub: Tub/Shower unit(Simultaneous filing. User may not have seen previous data.)    Bathroom Equipment: Grab bars in shower, Shower chair    Home Equipment: Rolling walker, [address] 450 bed    ADL Assistance: Needs assistance    Homemaking Assistance: Needs assistance    Homemaking Responsibilities: No    Ambulation Assistance: Independent    Transfer Assistance: Independent    Active : No    Additional Comments: Pt wears orthopedic shoes at baseline    The patient states she is current with [facility](RN per the ). The patient had a walker, but obtained a hospital bed, wheel chair, BSC and O2 last admission (from [address]). pharmacy is [address] [place] [address],[address] [address] [place].       Transportation is provided by [facility] () per the patient     Social Determinants of Health     Financial Resource Strain:     Difficulty of Paying Living Expenses:    Food Insecurity:     Worried About Running Out of Food in the Last Year:     [address] in the Last Year:    Transportation Needs:     Lack of Transportation (Medical):  Lack of Transportation (Non-Medical):    Physical Activity:     Days of Exercise per Week:     Minutes of Exercise per Session:    Stress:     Feeling of Stress :    Social Connections:     Frequency of Communication with Friends and Family:     Frequency of Social Gatherings with Friends and Family:     Attends L[employer] day Services:     Active Member of Clubs or Organizations:     Attends Club or Organization Meetings:     Marital Status:    Intimate Partner Violence:     Fear of Current or Ex-Partner:     Emotionally Abused:     Physically Abused:     Sexually Abused:          PHYSICAL EXAM       ED Triage Vitals [08/15/21 1153]   BP Temp Temp Source Pulse Resp SpO2 Height Weight   -- 97.2 °F (36.2 °C) Temporal 78 16 95 % -- 220 lb (99.8 kg)       Physical Exam  Vitals and nursing note reviewed. Constitutional:       Appearance: She is well-developed. HENT:      Head: Normocephalic. Right Ear: External ear normal.      Left Ear: External ear normal.   Eyes:      Conjunctiva/sclera: Conjunctivae normal.      Pupils: Pupils are equal, round, and reactive to light. Cardiovascular:      Rate and Rhythm: Normal rate and regular rhythm. Heart sounds: Normal heart sounds. Pulmonary:      Effort: Pulmonary effort is normal.      Breath sounds: Normal breath sounds. Abdominal:      General: Bowel sounds are normal. There is no distension. Palpations: Abdomen is soft. Tenderness: There is abdominal tenderness in the epigastric area. There is no guarding or rebound. Musculoskeletal:         General: Normal range of motion.       Cervical back: Normal range of motion and neck supple. Skin:     General: Skin is warm and dry. Neurological:      Mental Status: She is alert and oriented to person, place, and time. MDM  64 yo female presents to the ED with ab pain. Pt is afebrile, hemodynamically stable. Pt given 1  NS, IV morphine, IV zofran, IV pepcid in the ED. Labs remarkable for glucose 269, BUN, Cr 1.41, Hb 11.9. CT AP negative however shows bilateral pneumonia. Pt states she has been coughing x 3 weeks. Pt is 95% on RA, no fever, leukocytosis. Pt reassessed and still having pain. Pt given IV dilaudid, IV zofran in the ED. Pt educated about pneumonia and given ab pain warning signs. Pt given prescription for norco, levaquin. Pt given pneumonia warning signs and will f/u with pcp. Pt understands plan. FINAL IMPRESSION      1. Abdominal pain, unspecified abdominal location    2.  Pneumonia due to infectious organism, unspecified laterality, unspecified part of lung          DISPOSITION/PLAN   DISPOSITION          DISCHARGE MEDICATIONS:  [unfilled]         Daisy Velazquez MD(electronically signed)  Attending Emergency Physician            Daisy Velazquez MD  08/15/21 [id number] PCP: Dr. Gallito Rodas

## 2022-11-13 NOTE — H&P ADULT - NS ATTEND AMEND GEN_ALL_CORE FT
Angelina AMARAL) history as documented below reviewed. Please see consult for full details regarding the service. Patient seen and examined at the bedside. We were consulted for medical management of the pt while in the inpatient psychiatric unit.     A/P:   1. Psychosis   - Management as per psychiatry     2. Abnormal UA   - UA with trace ketones   - Encouraged hydration     DVT ppx: encourage ambulation     We will sign off, please reconsult as needed.

## 2022-11-13 NOTE — ED BEHAVIORAL HEALTH ASSESSMENT NOTE - AXIS III
Detail Level: Simple
Price (Do Not Change): 0.00
Instructions: This plan will send the code FBSE to the PM system.  DO NOT or CHANGE the price.
None

## 2022-11-13 NOTE — ED PROVIDER NOTE - OBJECTIVE STATEMENT
33 yo male bib brother for av hallucinations, pt is guarded and extremely polite. He would not say much. Brother states this has happened one time before.

## 2022-11-13 NOTE — ED PROVIDER NOTE - PHYSICAL EXAMINATION
Gen:  Well appearing in NAD  Head:  NC/AT  HEENT: pupils perrl,no pharyngeal erythema, uvula midline  Cardiac: S1S2, RRR  Abd: Soft, non tender  Resp: No distress, CTA   musculoskeletal:: no deformities, no swelling, strength +5/+5  Skin: warm and dry as visualized, no rashes  Neuro: PUCKETT, aao x 4  Psych:alert, cooperative, guarded mood and blank affect for situation

## 2022-11-13 NOTE — BH PATIENT PROFILE - HOME MEDICATIONS
predniSONE 10 mg oral tablet , 1 dose(s) orally once a day 40mg x2 days, 30mg x3 days, 20mg x 3 days, 10mg x3 days, 5mg x2 days  naproxen 500 mg oral delayed release tablet , 1 tab(s) orally 2 times a day   Zofran ODT 4 mg oral tablet, disintegrating , 1 tab(s) orally 3 times a day

## 2022-11-13 NOTE — H&P ADULT - ASSESSMENT
31 yo M no significant PMH admitted to 5N for Mx of Bizarre behavior at home.    # Psychosis/ Psych problem  - Manage as per primary psych team     # UA: Trace ketone  - Likely 2/2 to dehydration, d/w Pt and RN to encourage frequent oral hydration     # DVT Ppx  - Encourage Ambulation    IMPROVE VTE Individual Risk Assessment    RISK                                                                Points    [  ] Previous VTE                                                  3    [  ] Thrombophilia                                               2    [  ] Lower limb paralysis                                      2        (unable to hold up >15 seconds)      [  ] Current Cancer                                              2         (within 6 months)    [  ] Immobilization > 24 hrs                                1    [  ] ICU/CCU stay > 24 hours                              1    [  ] Age > 60                                                      1    IMPROVE VTE Score ___0______    IMPROVE Score 0-1: Low Risk, No VTE prophylaxis required for most patients, encourage ambulation.   IMPROVE Score 2-3: At risk, pharmacologic VTE prophylaxis is indicated for most patients (in the absence of a contraindication)  IMPROVE Score > or = 4: High Risk, pharmacologic VTE prophylaxis is indicated for most patients (in the absence of a contraindication)      Case d/w Dr. Barton

## 2022-11-13 NOTE — ED BEHAVIORAL HEALTH ASSESSMENT NOTE - HPI (INCLUDE ILLNESS QUALITY, SEVERITY, DURATION, TIMING, CONTEXT, MODIFYING FACTORS, ASSOCIATED SIGNS AND SYMPTOMS)
Patient a 33 y/o single Chinese male, domiciled with parents/brother, employed in [employer], no prior Psychiatric issues, no prior SI/SA, denied drug abuse hx, no Aggression/Agitation was BIB/Family due to bizarre behavior.    Patient in bed, alert, eyes open, putting his left hand to cover the mouth and not speaking. Patient in no distress, and on 1:1 for safety, and most of the information was obtained from his mother and 1:1 .     As per his mother she endorses that he is acting bizarre, screaming at times, screamed last night and they brought him here at  ED. His mother further mentioned that he is not sleeping/ poor eating habits, prefers mostly vegetables. As per the  he shouts or screams and repeat any words 3 times in a row before he mentions another topic and does the same repeating 3 times everything he says. He endorses .. " I'm free" , I'm good ", I'm sorry "..3 times every time. He has been doing this for past 1-2 months. He also endorses that he does not know what to say, does not know how to restrain himself, and I didn't mean to be disrespectful " etc. As per chart review he is talking to self, barricade himself in the room and his brother has to break open the door. He is also selectively mute and initially denied current S/H/I/P. His mother denied prior Psychiatric Intervention, no prior SI/SA, no drug abuse hx. and no medical issues.

## 2022-11-13 NOTE — ED PROVIDER NOTE - PROGRESS NOTE DETAILS
Attending Warner, received sign out from Dr. Brady.  Pt presents to ED with auditory hallucination and changes in behavior over the last 2 months.   Pt is pendig psychiatric evaluation. Attending Warner, received sign out from Dr. Brady.  Pt presents to ED with auditory hallucination and changes in behavior over the last 2 months.   Pt is pending psychiatric evaluation. Attending Warner, Dr. Avelino olivia and pt to be admitted 5[facility]

## 2022-11-13 NOTE — H&P ADULT - NSHPPHYSICALEXAM_GEN_ALL_CORE
Vital Signs Last 24 Hrs  T(C): 36.5 (13 Nov 2022 16:47), Max: 37.2 (13 Nov 2022 03:24)  T(F): 97.7 (13 Nov 2022 16:47), Max: 99 (13 Nov 2022 03:24)  HR: 84 (13 Nov 2022 15:37) (84 - 93)  BP: 133/95 (13 Nov 2022 15:53) (133/95 - 154/99)  BP(mean): 117 (13 Nov 2022 15:37) (111 - 117)  RR: 16 (13 Nov 2022 16:47) (16 - 18)  SpO2: 100% (13 Nov 2022 16:47) (98% - 100%)    Parameters below as of 13 Nov 2022 15:37  Patient On (Oxygen Delivery Method): room air

## 2022-11-13 NOTE — BH PATIENT PROFILE - NSPROEDALEARNPREF_GEN_A_NUR
audio/computer/internet/group instruction/pictorial/skill demonstration/verbal instruction/written material

## 2022-11-13 NOTE — ED ADULT TRIAGE NOTE - CHIEF COMPLAINT QUOTE
PT bib brother due to having auditory hallucinations x2 months that are worsening PT was noted to be screaming by family in the middle of the night saying "go away". Brother reports PT locked himself in his room and brother had to break into room. Brother reports PT having negative thoughts and is concerned for suicidal thoughts. PT denies SI/HI in triage. PT states he is here because he has a headache. PT placed on 1:1 PT bib brother due to having auditory hallucinations x2 months that are worsening PT was noted to be screaming by family in the middle of the night saying "go away". Brother reports PT locked himself in his room and brother had to break into room. Brother reports PT having negative thoughts and is concerned for suicidal thoughts. PT denies SI/HI in triage. PT states he is here because he has a headache. PT placed on 1:1.   Brother -Joe Moses- [phone]  Mother -Karey Moses- [phone]

## 2022-11-13 NOTE — BH PATIENT PROFILE - FALL HARM RISK - UNIVERSAL INTERVENTIONS
Bed in lowest position, wheels locked, appropriate side rails in place/Call bell, personal items and telephone in reach/Instruct patient to call for assistance before getting out of bed or chair/Non-slip footwear when patient is out of bed/[place] to call system/Physically safe environment - no spills, clutter or unnecessary equipment/Purposeful Proactive Rounding/Room/bathroom lighting operational, light cord in reach

## 2022-11-13 NOTE — ED ADULT NURSE REASSESSMENT NOTE - NS ED NURSE REASSESS COMMENT FT1
Received report from RN Jennifer. pt awaiting psych eval, stretcher in view of nursing station. Pt shouting "I'm free, I'm free, I'm free". mother and father present at bedside, one to one in place constant observation. pt offered breakfast tray, pt ambulated to bathroom. safety maintained at this time.

## 2022-11-13 NOTE — H&P ADULT - NSHPPOACENTRALVENOUSCATHETER_GEN_ALL_CORE
+ Block, + LensAR.
Benign moles removed for free if pt elects.
Monitor.
Patient made aware of 24/7 emergency services.
Patient understands there is an increased risk of corneal edema after cataract surgery.
Post op gtt instructions reviewed with patient.
Reassess for Custom Vision OS, goal of Emmetropia.
Recommend AREDS 2 multivitamin supplements.
Recommend Bilateral lower lid blepharoplasty trans conj laser (discussed risks and benefits of sx. .. ).
Recommend Bilateral upper lid blepharoplasty. Medicare/cosmetic if insurance doesn't cover (discussed risks and benefits of sx. .. ).
Recommend Mini lower lift, +/- platysmaplasty, pre-tragel, SMAS to cheeks/chin (discussed risks and benefits of sx. .. ).
The patient feels that the cataract is significantly impacting daily activities and has elected cataract surgery. The risks, benefits, and alternatives to surgery were discussed. The patient elects to proceed with surgery.
The patient was informed that with [facility] for distance, they will need glasses for all near and intermediate activities after surgery. The patient understands there is a possibility they may need an enhancement after surgery. The patient elects Custom Vision OD, goal of emmetropia.
The types of intraocular lenses were reviewed with the patient along with a discussion of their various strengths and weaknesses.
This visual field clearly demonstrated a minimum of 52% loss of upper field of vision OU, with upper lid skin in repose and elevated by taping of the lid to demonstrate potential correction. This field shows that taping the lids significantly improved this patient's superior field of vision by approximately 48%, OU.
skin laxity, will have relapse.
no

## 2022-11-13 NOTE — ED BEHAVIORAL HEALTH ASSESSMENT NOTE - SUMMARY
Patient a 33 y/o single Chinese male, domiciled with parents/brother, employed in [employer], no prior Psychiatric issues, no prior SI/SA, denied drug abuse hx, no Aggression/Agitation was BIB/Family due to bizarre behavior.    Patient in bed, alert, eyes open, putting his left hand to cover the mouth and not speaking. Patient in no distress, and on 1:1 for safety, and most of the information was obtained from his mother and 1:1 .     As per his mother she endorses that he is acting bizarre, screaming at times, screamed last night and they brought him here at  ED. His mother further mentioned that he is not sleeping/ poor eating habits, prefers mostly vegetables. As per the  he shouts or screams and repeat any words 3 times in a row before he mentions another topic and does the same repeating 3 times everything he says. he endorses .. " I'm free" , I'm good ", I'm sorry "..3 times every time. He also endorses that he does not know what to say, does not know how to restrain himself, and I didn't mean to be disrespectful " etc. As per chart review he is talking to self, barricade himself in the room and his brother has to break open the door. He is also selectively mute and initially denied current S/H/I/P. His mother denied prior Psychiatric Intervention, no prior SI/SA, no drug abuse hx. and no medical issues.    Plan: Need admission for stability         Start Risperdal 0.5 mg BID         Ativan/Haldol PRN PO/IM         Legal--9.39

## 2022-11-14 DIAGNOSIS — F25.9 SCHIZOAFFECTIVE DISORDER, UNSPECIFIED: ICD-10-CM

## 2022-11-14 LAB
A1C WITH ESTIMATED AVERAGE GLUCOSE RESULT: 5.2 % — SIGNIFICANT CHANGE UP (ref 4–5.6)
CHOLEST SERPL-MCNC: 147 MG/DL — SIGNIFICANT CHANGE UP
ESTIMATED AVERAGE GLUCOSE: 103 MG/DL — SIGNIFICANT CHANGE UP (ref 68–114)
HDLC SERPL-MCNC: 87 MG/DL — SIGNIFICANT CHANGE UP
LIPID PNL WITH DIRECT LDL SERPL: 44 MG/DL — SIGNIFICANT CHANGE UP
NON HDL CHOLESTEROL: 59 MG/DL — SIGNIFICANT CHANGE UP
TRIGL SERPL-MCNC: 76 MG/DL — SIGNIFICANT CHANGE UP
TSH SERPL-MCNC: 1.12 UU/ML — SIGNIFICANT CHANGE UP (ref 0.34–4.82)

## 2022-11-14 PROCEDURE — 99232 SBSQ HOSP IP/OBS MODERATE 35: CPT

## 2022-11-14 RX ADMIN — RISPERIDONE 0.5 MILLIGRAM(S): 4 TABLET ORAL at 09:54

## 2022-11-15 PROCEDURE — 99232 SBSQ HOSP IP/OBS MODERATE 35: CPT

## 2022-11-15 RX ORDER — RISPERIDONE 4 MG/1
1 TABLET ORAL
Refills: 0 | Status: DISCONTINUED | OUTPATIENT
Start: 2022-11-15 | End: 2022-11-16

## 2022-11-15 RX ADMIN — RISPERIDONE 0.5 MILLIGRAM(S): 4 TABLET ORAL at 09:53

## 2022-11-15 RX ADMIN — RISPERIDONE 1 MILLIGRAM(S): 4 TABLET ORAL at 20:09

## 2022-11-15 NOTE — BH SAFETY PLAN - WARNING SIGN 5
Trigger:  onset of mental illness and requires treatment.  Trigger:  Non adherent to medication and aftercare.

## 2022-11-15 NOTE — BH SOCIAL WORK INITIAL PSYCHOSOCIAL EVALUATION - NSPROGENSOURCEINFO_PSY_ALL_CORE
Pt unable to provide information for assessment due to acuity of sx, information gathered from ED eval.  per ED eval, "As per his mother she endorses that he is acting bizarre, screaming at times, screamed last night and they brought him here at  ED. His mother further mentioned that he is not sleeping/ poor eating habits, prefers mostly vegetables. As per the  he shouts or screams and repeat any words 3 times in a row before he mentions another topic and does the same repeating 3 times everything he says. He endorses .. " I'm free" , I'm good ", I'm sorry "..3 times every time. He has been doing this for past 1-2 months. He also endorses that he does not know what to say, does not know how to restrain himself, and I didn't mean to be disrespectful " etc. As per chart review he is talking to self, barricade himself in the room and his brother has to break open the door. He is also selectively mute and initially denied current S/H/I/P. His mother denied prior Psychiatric Intervention, no prior SI/SA, no drug abuse hx. and no medical issues"./Transcribed from patient self report

## 2022-11-15 NOTE — BH SAFETY PLAN - WARNING SIGN 6
Trigger: Conflict with parents. Trigger: Employment is stressful at times. Trigger: Somatic symptoms and doing "body cleanouts".

## 2022-11-15 NOTE — BH SAFETY PLAN - ENVIRONMENT SAFETY 2:
Follow all discharge plans given to fraire and keep all aftercare appointments made for you.  Follow all discharge plans given to you and keep all aftercare appointments made for you.

## 2022-11-15 NOTE — PSYCHIATRIC REHAB INITIAL EVALUATION - NSBHEMPDEFICITSFT_PSY_ALL_CORE
Busy and can be stressful. Birth Control Pills Counseling: Birth Control Pill Counseling: I discussed with the patient the potential side effects of OCPs including but not limited to increased risk of stroke, heart attack, thrombophlebitis, deep venous thrombosis, hepatic adenomas, breast changes, GI upset, headaches, and depression.  The patient verbalized understanding of the proper use and possible adverse effects of OCPs. All of the patient's questions and concerns were addressed.

## 2022-11-16 PROCEDURE — 99231 SBSQ HOSP IP/OBS SF/LOW 25: CPT

## 2022-11-16 RX ORDER — RISPERIDONE 4 MG/1
1 TABLET ORAL THREE TIMES A DAY
Refills: 0 | Status: DISCONTINUED | OUTPATIENT
Start: 2022-11-16 | End: 2022-11-17

## 2022-11-16 RX ADMIN — RISPERIDONE 1 MILLIGRAM(S): 4 TABLET ORAL at 09:53

## 2022-11-16 RX ADMIN — RISPERIDONE 1 MILLIGRAM(S): 4 TABLET ORAL at 23:23

## 2022-11-17 LAB — SARS-COV-2 RNA SPEC QL NAA+PROBE: SIGNIFICANT CHANGE UP

## 2022-11-17 PROCEDURE — 99232 SBSQ HOSP IP/OBS MODERATE 35: CPT

## 2022-11-17 RX ORDER — RISPERIDONE 4 MG/1
2 TABLET ORAL AT BEDTIME
Refills: 0 | Status: DISCONTINUED | OUTPATIENT
Start: 2022-11-17 | End: 2022-11-21

## 2022-11-17 RX ORDER — RISPERIDONE 4 MG/1
1 TABLET ORAL DAILY
Refills: 0 | Status: DISCONTINUED | OUTPATIENT
Start: 2022-11-18 | End: 2022-11-20

## 2022-11-17 RX ADMIN — Medication 1 MILLIGRAM(S): at 10:53

## 2022-11-17 RX ADMIN — RISPERIDONE 1 MILLIGRAM(S): 4 TABLET ORAL at 10:53

## 2022-11-17 RX ADMIN — RISPERIDONE 2 MILLIGRAM(S): 4 TABLET ORAL at 20:24

## 2022-11-17 RX ADMIN — Medication 1 MILLIGRAM(S): at 20:25

## 2022-11-18 PROCEDURE — 99232 SBSQ HOSP IP/OBS MODERATE 35: CPT

## 2022-11-18 RX ADMIN — Medication 1 MILLIGRAM(S): at 09:33

## 2022-11-18 RX ADMIN — RISPERIDONE 1 MILLIGRAM(S): 4 TABLET ORAL at 09:32

## 2022-11-18 RX ADMIN — Medication 1 MILLIGRAM(S): at 20:44

## 2022-11-18 RX ADMIN — RISPERIDONE 2 MILLIGRAM(S): 4 TABLET ORAL at 20:44

## 2022-11-19 RX ADMIN — RISPERIDONE 1 MILLIGRAM(S): 4 TABLET ORAL at 09:47

## 2022-11-19 RX ADMIN — RISPERIDONE 2 MILLIGRAM(S): 4 TABLET ORAL at 20:14

## 2022-11-20 PROCEDURE — 99232 SBSQ HOSP IP/OBS MODERATE 35: CPT

## 2022-11-20 RX ORDER — RISPERIDONE 4 MG/1
2 TABLET ORAL DAILY
Refills: 0 | Status: DISCONTINUED | OUTPATIENT
Start: 2022-11-20 | End: 2022-11-21

## 2022-11-20 RX ADMIN — RISPERIDONE 2 MILLIGRAM(S): 4 TABLET ORAL at 20:59

## 2022-11-20 RX ADMIN — RISPERIDONE 1 MILLIGRAM(S): 4 TABLET ORAL at 09:43

## 2022-11-20 NOTE — BH INPATIENT PSYCHIATRY PROGRESS NOTE - NSBHPSYCHOLCOGABN_PSY_A_CORE
disoriented to time/disoriented to place/disoriented to situation
disoriented to time
disoriented to time/disoriented to place/disoriented to situation

## 2022-11-21 LAB — SARS-COV-2 RNA SPEC QL NAA+PROBE: SIGNIFICANT CHANGE UP

## 2022-11-21 PROCEDURE — 99232 SBSQ HOSP IP/OBS MODERATE 35: CPT

## 2022-11-21 RX ORDER — RISPERIDONE 4 MG/1
1 TABLET ORAL DAILY
Refills: 0 | Status: DISCONTINUED | OUTPATIENT
Start: 2022-11-22 | End: 2022-11-23

## 2022-11-21 RX ORDER — RISPERIDONE 4 MG/1
3 TABLET ORAL AT BEDTIME
Refills: 0 | Status: DISCONTINUED | OUTPATIENT
Start: 2022-11-22 | End: 2022-11-23

## 2022-11-21 RX ORDER — RISPERIDONE 4 MG/1
2 TABLET ORAL ONCE
Refills: 0 | Status: COMPLETED | OUTPATIENT
Start: 2022-11-21 | End: 2022-11-21

## 2022-11-21 RX ADMIN — RISPERIDONE 2 MILLIGRAM(S): 4 TABLET ORAL at 05:34

## 2022-11-21 RX ADMIN — RISPERIDONE 2 MILLIGRAM(S): 4 TABLET ORAL at 20:40

## 2022-11-21 RX ADMIN — RISPERIDONE 2 MILLIGRAM(S): 4 TABLET ORAL at 10:14

## 2022-11-21 NOTE — BH TREATMENT PLAN - NSTXDCOTHRINTERSW_PSY_ALL_CORE
SW to meet with pt when stable for dc to discuss dcp
SW to meet with pt when stable for dc to discuss dcp

## 2022-11-21 NOTE — BH TREATMENT PLAN - NSTXDCOTHRINTERPSY_PSY_ALL_CORE
Family aware of Psychosis and bizarre behavior and is taking meds
Family aware of Psychosis and bizarre behavior and is taking meds

## 2022-11-21 NOTE — BH TREATMENT PLAN - NSTXCOPEINTERPR_PSY_ALL_CORE
Patient remains isolative and preoccupied and has been unable to engage in group programming at this time. Treatment goal is to continue for next seven days.
Patient will be encouraged to attend 1-2 psych rehab groups a day to work on coping skill development.

## 2022-11-21 NOTE — BH TREATMENT PLAN - NSTXDISORGINTERPSY_PSY_ALL_CORE
On Risperdal trial and to join groups when more stable
On Risperdal trial and to join groups when more stable and able to answer some questions

## 2022-11-21 NOTE — BH TREATMENT PLAN - NSTXCOPEINTERMD_PSY_ALL_CORE
Meds compliant started on Risperdal 0.5 mg BID and to titrate as needed for stability/safety, Risperdal increased to 1 mg BID, now on Risperdal 1 mg AM and Risperdal 3 mg HS.
Meds compliant started on Risperdal 0.5 mg BID and to titrate as needed for stability/safety, Riisperdal increased to 1 mg BID

## 2022-11-21 NOTE — BH TREATMENT PLAN - NSTXPATIENTPARTICIPATE_PSY_ALL_CORE
Patient participated in development of after care plan
Patient participated in development of after care plan

## 2022-11-21 NOTE — BH TREATMENT PLAN - NSTXPLANTHERAPYSESSIONSFT_PSY_ALL_CORE
11-16-22  --  --  --  --  --  Therapy Summary: Patient encouraged to participate in psych rehab groups but declined.

## 2022-11-21 NOTE — BH TREATMENT PLAN - NSTXCAREGIVERPARTICIPATE_PSY_P_CORE
Family/Caregiver participated in development of after care plan
Family/Caregiver participated in identification of needs/problems/goals for treatment

## 2022-11-21 NOTE — BH TREATMENT PLAN - NSTXDISORGINTERMD_PSY_ALL_CORE
To continue with Risperdal trial for improved Thought processes to make reasonable good decision. Risperdal increased to 1 mg BID, now on Risperdal 1 mg AM and Risperdal 3 mg HS.
To continue with Risperdal trial for improved Thought processes to make reasonable good decision.

## 2022-11-21 NOTE — BH TREATMENT PLAN - NSCMSPTSTRENGTHS_PSY_ALL_CORE
Financial stability/Strong support system/Supportive family
Financial stability/Strong support system/Supportive family

## 2022-11-22 PROCEDURE — 99231 SBSQ HOSP IP/OBS SF/LOW 25: CPT

## 2022-11-22 RX ADMIN — RISPERIDONE 1 MILLIGRAM(S): 4 TABLET ORAL at 09:52

## 2022-11-22 RX ADMIN — RISPERIDONE 3 MILLIGRAM(S): 4 TABLET ORAL at 20:53

## 2022-11-22 NOTE — BH INPATIENT PSYCHIATRY PROGRESS NOTE - NSBHADMITMEDEDUDETAILS_A_CORE FT
Patient to continue Risperdal trial for continued stability/safety.

## 2022-11-22 NOTE — BH DISCHARGE NOTE NURSING/SOCIAL WORK/PSYCH REHAB - PATIENT PORTAL LINK FT
You can access the FollowMyHealth Patient Portal offered by [facility] by registering at the following website: [email or web address]. By joining [facility]’s FollowMyHealth portal, you will also be able to view your health information using other applications (apps) compatible with our system.

## 2022-11-22 NOTE — CHART NOTE - NSCHARTNOTEFT_GEN_A_CORE
Met with patient to discuss discharge planning.  Pt able to have a conversation, made eye contact, answered questions and agreed to aftercare at [facility].  Patient agrees that he needs to continue medication and see a therapist to have support in the community.  Faxed referral to [facility] for review.  Patient's pharmacy is [facility] on [address].  Provided that information to Dr. You.

## 2022-11-22 NOTE — BH DISCHARGE NOTE NURSING/SOCIAL WORK/PSYCH REHAB - NSDCADDINFO1FT_PSY_ALL_CORE
You have an intake appointment at [facility] in [place] on  Please bring your ID, Insurance card and copay if applicable. You have an intake appointment at [facility] in [place] on Monday, November 28, 2022 at 3:15pm.  Please bring your ID, Insurance card and copay if applicable. You have an intake appointment at [facility] in [place] on Monday, November 28, 2022 at 3:15pm with Tianna Salgado LCSW  Please bring your ID, Insurance card and copay if applicable. You have an intake appointment at [facility] in [place] on Monday, November 28, 2022 at 3:15pm with Tianna Salgado LCSW  Please bring your ID, Insurance card and co-pay if applicable.

## 2022-11-22 NOTE — BH INPATIENT PSYCHIATRY PROGRESS NOTE - NSBHCHARTREVIEWINVESTIGATE_PSY_A_CORE FT
< from: 12 Lead ECG (08.02.21 @ 21:05) >    Ventricular Rate 85 BPM    Atrial Rate 85 BPM    P-R Interval 166 ms    QRS Duration 102 ms    Q-T Interval 356 ms    QTC Calculation(Bazett) 423 ms    P Axis 15 degrees    R Axis 65 degrees    T Axis 26 degrees    Diagnosis Line Normal sinus rhythm  Incomplete right bundle branch block  Borderline ECG  When compared with ECG of 16-MAR-2019 16:15,  No significant change was found  Confirmed by MELO URRUTIA, ASIM PORTER ([id number]) on 8/3/2021 10:19:00 AM        																									

## 2022-11-22 NOTE — BH INPATIENT PSYCHIATRY PROGRESS NOTE - NSBHADMITMEDEDUDETAILS_PSY_A_CORE FT
Patient aware of meds s/e, benefits/risks of Risperdal

## 2022-11-22 NOTE — BH DISCHARGE NOTE NURSING/SOCIAL WORK/PSYCH REHAB - NSCDUDCCRISIS_PSY_A_CORE
.  [facility] - [facility] – Crisis Care for Children, Adults and Families  [address] [place] [address]  Mobile Crisis Hotline – (818) 447-9285/.  [facility] Response Crisis Hotline  (582) 138-2980  24 hour telephone crisis intervention and suicide prevention hotline concerned with all mental health issues/988 Suicide and Crisis Lifeline .  [facility] - [facility] – Crisis Care for Children, Adults and Families  [address] [place] [address]  Mobile Crisis Hotline – (148) 224-8814/.  [facility] Response Crisis Hotline  (110) 103-5666  24 hour telephone crisis intervention and suicide prevention hotline concerned with all mental health issues/Other.../195 Suicide and Crisis Lifeline

## 2022-11-22 NOTE — BH INPATIENT PSYCHIATRY PROGRESS NOTE - NSBHCHARTREVIEWLAB_PSY_A_CORE FT
15.1   9.45  )-----------( 310      ( 13 Nov 2022 03:53 )             43.8   11-13    142  |  108  |  13  ----------------------------<  114<H>  3.7   |  30  |  0.88    Ca    9.4      13 Nov 2022 03:53    TPro  7.6  /  Alb  4.3  /  TBili  0.5  /  DBili  x   /  AST  6<L>  /  ALT  15  /  AlkPhos  51  11-13  

## 2022-11-22 NOTE — BH DISCHARGE NOTE NURSING/SOCIAL WORK/PSYCH REHAB - NSDCPLANREVIEWED_PSY_ALL_CORE
Patient alert, oriented x3, pleasant and cooperative.  Pt denies S/H IIP currently.  Nurse and  reviewed discharge plan with patient who agrees and accepts plan.  Pt provided with a copy of discharge paperwork.  Pt appropriately dressed for discharge and is transported home by family , friend or taxi to ensure safe arrival home.  Patient agrees to review his discharge paperwork in the patient portal/Yes

## 2022-11-23 VITALS — TEMPERATURE: 98 F | OXYGEN SATURATION: 100 % | RESPIRATION RATE: 18 BRPM

## 2022-11-23 PROCEDURE — 99239 HOSP IP/OBS DSCHRG MGMT >30: CPT

## 2022-11-23 RX ORDER — RISPERIDONE 4 MG/1
1 TABLET ORAL
Qty: 30 | Refills: 0
Start: 2022-11-23 | End: 2022-12-22

## 2022-11-23 RX ADMIN — RISPERIDONE 1 MILLIGRAM(S): 4 TABLET ORAL at 09:34

## 2022-11-23 NOTE — BH INPATIENT PSYCHIATRY PROGRESS NOTE - NSBHATTESTBILLINGAW_PSY_A_CORE
83024-Emxengtsid Inpatient care - moderate complexity - 25 minutes
Billing in another system
[id number]-Ornyeqorwv Inpatient care - moderate complexity - 25 minutes
Billing in another system
[id number]-Vesgsycwwl Inpatient care - moderate complexity - 25 minutes
56154-Zokoxzhbzg Inpatient care - moderate complexity - 25 minutes
04075-Csiojemvdd Inpatient care - moderate complexity - 25 minutes
55281-Gslusdvfex Inpatient care - moderate complexity - 25 minutes
14205-Fbrbflmsjk Inpatient care - low complexity - 15 minutes

## 2022-11-23 NOTE — BH INPATIENT PSYCHIATRY PROGRESS NOTE - NSBHADMITIPREASONDETAILS_PSY_A_CORE FT
Poor self care
Poor self care with bizarre behavior
Poor self care

## 2022-11-23 NOTE — BH INPATIENT PSYCHIATRY PROGRESS NOTE - NSBHMSEMOOD_PSY_A_CORE
Anxious/Irritable
Normal
Anxious/Irritable
Anxious/Irritable
Normal
Normal

## 2022-11-23 NOTE — BH INPATIENT PSYCHIATRY PROGRESS NOTE - NSBHASSESSSUMMFT_PSY_ALL_CORE
Patient is doing well now, and has been meds complaint since he ws admitted. Not S/h and to be discharged today with F/U with [facility] as per SW placement. Nursing to go over his meds and was given 30 days supply of meds before discharge.    Plan: Risperdal 1 mg AM          Risperdal 3 mg HS          F/U with [facility] at [place].
· Summary (brief):	Patient a 33 y/o single Chinese male, domiciled with parents/brother, employed in [employer], no prior Psychiatric issues, no prior SI/SA, denied drug abuse hx, no Aggression/Agitation was BIB/Family due to bizarre behavior.    Patient in bed, alert, eyes open, putting his left hand to cover the mouth and not speaking. Patient in no distress, and on 1:1 for safety, and most of the information was obtained from his mother and 1:1 .     As per his mother she endorses that he is acting bizarre, screaming at times, screamed last night and they brought him here at  ED. His mother further mentioned that he is not sleeping/ poor eating habits, prefers mostly vegetables. As per the  he shouts or screams and repeat any words 3 times in a row before he mentions another topic and does the same repeating 3 times everything he says. he endorses .. " I'm free" , I'm good ", I'm sorry "..3 times every time. He also endorses that he does not know what to say, does not know how to restrain himself, and I didn't mean to be disrespectful " etc. As per chart review he is talking to self, barricade himself in the room and his brother has to break open the door. He is also selectively mute and initially denied current S/H/I/P. His mother denied prior Psychiatric Intervention, no prior SI/SA, no drug abuse hx. and no medical issues.    Plan: Need admission for stability          Start Risperdal 0.5 mg BID          Ativan/Haldol PRN PO/IM          Legal--9.39  
· Summary (brief):	Patient a 31 y/o single Chinese male, domiciled with parents/brother, employed in [employer], no prior Psychiatric issues, no prior SI/SA, denied drug abuse hx, no Aggression/Agitation was BIB/Family due to bizarre behavior.    Patient in bed, alert, eyes open, putting his left hand to cover the mouth and not speaking. Patient in no distress, and on 1:1 for safety, and most of the information was obtained from his mother and 1:1 .     As per his mother she endorses that he is acting bizarre, screaming at times, screamed last night and they brought him here at  ED. His mother further mentioned that he is not sleeping/ poor eating habits, prefers mostly vegetables. As per the  he shouts or screams and repeat any words 3 times in a row before he mentions another topic and does the same repeating 3 times everything he says. he endorses .. " I'm free" , I'm good ", I'm sorry "..3 times every time. He also endorses that he does not know what to say, does not know how to restrain himself, and I didn't mean to be disrespectful " etc. As per chart review he is talking to self, barricade himself in the room and his brother has to break open the door. He is also selectively mute and initially denied current S/H/I/P. His mother denied prior Psychiatric Intervention, no prior SI/SA, no drug abuse hx. and no medical issues.    Plan: Need admission for stability          Start Risperdal 0.5 mg BID          Ativan/Haldol PRN PO/IM          Legal--9.39  
· Summary (brief):	Patient a 33 y/o single Chinese male, domiciled with parents/brother, employed in [employer], no prior Psychiatric issues, no prior SI/SA, denied drug abuse hx, no Aggression/Agitation was BIB/Family due to bizarre behavior.    Patient in bed, alert, eyes open, putting his left hand to cover the mouth and not speaking. Patient in no distress, and on 1:1 for safety, and most of the information was obtained from his mother and 1:1 .     As per his mother she endorses that he is acting bizarre, screaming at times, screamed last night and they brought him here at  ED. His mother further mentioned that he is not sleeping/ poor eating habits, prefers mostly vegetables. As per the  he shouts or screams and repeat any words 3 times in a row before he mentions another topic and does the same repeating 3 times everything he says. he endorses .. " I'm free" , I'm good ", I'm sorry "..3 times every time. He also endorses that he does not know what to say, does not know how to restrain himself, and I didn't mean to be disrespectful " etc. As per chart review he is talking to self, barricade himself in the room and his brother has to break open the door. He is also selectively mute and initially denied current S/H/I/P. His mother denied prior Psychiatric Intervention, no prior SI/SA, no drug abuse hx. and no medical issues.    Plan: Need admission for stability          Start Risperdal 0.5 mg BID          Ativan/Haldol PRN PO/IM          Legal--9.39  
· Summary (brief):	Patient a 31 y/o single Chinese male, domiciled with parents/brother, employed in [employer], no prior Psychiatric issues, no prior SI/SA, denied drug abuse hx, no Aggression/Agitation was BIB/Family due to bizarre behavior.    Patient in bed, alert, eyes open, putting his left hand to cover the mouth and not speaking. Patient in no distress, and on 1:1 for safety, and most of the information was obtained from his mother and 1:1 .     As per his mother she endorses that he is acting bizarre, screaming at times, screamed last night and they brought him here at  ED. His mother further mentioned that he is not sleeping/ poor eating habits, prefers mostly vegetables. As per the  he shouts or screams and repeat any words 3 times in a row before he mentions another topic and does the same repeating 3 times everything he says. he endorses .. " I'm free" , I'm good ", I'm sorry "..3 times every time. He also endorses that he does not know what to say, does not know how to restrain himself, and I didn't mean to be disrespectful " etc. As per chart review he is talking to self, barricade himself in the room and his brother has to break open the door. He is also selectively mute and initially denied current S/H/I/P. His mother denied prior Psychiatric Intervention, no prior SI/SA, no drug abuse hx. and no medical issues.    Plan: Need admission for stability          Start Risperdal 0.5 mg BID          Ativan/Haldol PRN PO/IM          Legal--9.39  
31 y/o single Chinese male with first psychiatric admission.  Pt with preoccupation and avoidant behavior .Pt having difficulty in verbalizing his thoughts . Pt still needing adjustment of medications to treat psychosis . 
· Summary (brief):	Patient a 33 y/o single Chinese male, domiciled with parents/brother, employed in [employer], no prior Psychiatric issues, no prior SI/SA, denied drug abuse hx, no Aggression/Agitation was BIB/Family due to bizarre behavior.    Patient in bed, alert, eyes open, putting his left hand to cover the mouth and not speaking. Patient in no distress, and on 1:1 for safety, and most of the information was obtained from his mother and 1:1 .     As per his mother she endorses that he is acting bizarre, screaming at times, screamed last night and they brought him here at  ED. His mother further mentioned that he is not sleeping/ poor eating habits, prefers mostly vegetables. As per the  he shouts or screams and repeat any words 3 times in a row before he mentions another topic and does the same repeating 3 times everything he says. he endorses .. " I'm free" , I'm good ", I'm sorry "..3 times every time. He also endorses that he does not know what to say, does not know how to restrain himself, and I didn't mean to be disrespectful " etc. As per chart review he is talking to self, barricade himself in the room and his brother has to break open the door. He is also selectively mute and initially denied current S/H/I/P. His mother denied prior Psychiatric Intervention, no prior SI/SA, no drug abuse hx. and no medical issues.    Plan: Need admission for stability          Start Risperdal 0.5 mg BID          Ativan/Haldol PRN PO/IM          Legal--9.39  
· Summary (brief):	Patient a 33 y/o single Chinese male, domiciled with parents/brother, employed in [employer], no prior Psychiatric issues, no prior SI/SA, denied drug abuse hx, no Aggression/Agitation was BIB/Family due to bizarre behavior.    Patient in bed, alert, eyes open, putting his left hand to cover the mouth and not speaking. Patient in no distress, and on 1:1 for safety, and most of the information was obtained from his mother and 1:1 .     As per his mother she endorses that he is acting bizarre, screaming at times, screamed last night and they brought him here at  ED. His mother further mentioned that he is not sleeping/ poor eating habits, prefers mostly vegetables. As per the  he shouts or screams and repeat any words 3 times in a row before he mentions another topic and does the same repeating 3 times everything he says. he endorses .. " I'm free" , I'm good ", I'm sorry "..3 times every time. He also endorses that he does not know what to say, does not know how to restrain himself, and I didn't mean to be disrespectful " etc. As per chart review he is talking to self, barricade himself in the room and his brother has to break open the door. He is also selectively mute and initially denied current S/H/I/P. His mother denied prior Psychiatric Intervention, no prior SI/SA, no drug abuse hx. and no medical issues.    Plan: Need admission for stability          Start Risperdal 0.5 mg BID          Ativan/Haldol PRN PO/IM          Legal--9.39

## 2022-11-23 NOTE — BH INPATIENT PSYCHIATRY PROGRESS NOTE - CURRENT MEDICATION
MEDICATIONS  (STANDING):  risperiDONE   Tablet 1 milliGRAM(s) Oral two times a day    MEDICATIONS  (PRN):  haloperidol     Tablet 2 milliGRAM(s) Oral every 8 hours PRN Mod Agitation  haloperidol    Injectable 5 milliGRAM(s) IntraMuscular every 6 hours PRN Sev Agitation  LORazepam     Tablet 1 milliGRAM(s) Oral every 8 hours PRN Mod Agitation  LORazepam   Injectable 2 milliGRAM(s) IntraMuscular every 8 hours PRN Sev Agitation  
MEDICATIONS  (STANDING):  risperiDONE   Tablet 0.5 milliGRAM(s) Oral two times a day    MEDICATIONS  (PRN):  haloperidol     Tablet 2 milliGRAM(s) Oral every 8 hours PRN Mod Agitation  haloperidol    Injectable 5 milliGRAM(s) IntraMuscular every 6 hours PRN Sev Agitation  LORazepam     Tablet 1 milliGRAM(s) Oral every 8 hours PRN Mod Agitation  LORazepam   Injectable 2 milliGRAM(s) IntraMuscular every 8 hours PRN Sev Agitation  
MEDICATIONS  (STANDING):  risperiDONE   Tablet 1 milliGRAM(s) Oral three times a day    MEDICATIONS  (PRN):  haloperidol     Tablet 2 milliGRAM(s) Oral every 8 hours PRN Mod Agitation  haloperidol    Injectable 5 milliGRAM(s) IntraMuscular every 6 hours PRN Sev Agitation  LORazepam     Tablet 1 milliGRAM(s) Oral every 8 hours PRN Mod Agitation  LORazepam   Injectable 2 milliGRAM(s) IntraMuscular every 8 hours PRN Sev Agitation  
MEDICATIONS  (STANDING):  risperiDONE  Disintegrating Tablet 2 milliGRAM(s) Oral once    MEDICATIONS  (PRN):  haloperidol     Tablet 2 milliGRAM(s) Oral every 8 hours PRN Mod Agitation  haloperidol    Injectable 5 milliGRAM(s) IntraMuscular every 6 hours PRN Sev Agitation  
MEDICATIONS  (STANDING):  risperiDONE  Disintegrating Tablet 1 milliGRAM(s) Oral daily  risperiDONE  Disintegrating Tablet 3 milliGRAM(s) Oral at bedtime    MEDICATIONS  (PRN):  haloperidol     Tablet 2 milliGRAM(s) Oral every 8 hours PRN Mod Agitation  haloperidol    Injectable 5 milliGRAM(s) IntraMuscular every 6 hours PRN Sev Agitation  
MEDICATIONS  (STANDING):  risperiDONE   Tablet 1 milliGRAM(s) Oral two times a day    MEDICATIONS  (PRN):  haloperidol     Tablet 2 milliGRAM(s) Oral every 8 hours PRN Mod Agitation  haloperidol    Injectable 5 milliGRAM(s) IntraMuscular every 6 hours PRN Sev Agitation  LORazepam     Tablet 1 milliGRAM(s) Oral every 8 hours PRN Mod Agitation  LORazepam   Injectable 2 milliGRAM(s) IntraMuscular every 8 hours PRN Sev Agitation  
MEDICATIONS  (STANDING):  risperiDONE   Tablet 2 milliGRAM(s) Oral at bedtime  risperiDONE   Tablet 2 milliGRAM(s) Oral daily    MEDICATIONS  (PRN):  haloperidol     Tablet 2 milliGRAM(s) Oral every 8 hours PRN Mod Agitation  haloperidol    Injectable 5 milliGRAM(s) IntraMuscular every 6 hours PRN Sev Agitation  LORazepam     Tablet 1 milliGRAM(s) Oral every 8 hours PRN Mod Agitation  LORazepam   Injectable 2 milliGRAM(s) IntraMuscular every 8 hours PRN Sev Agitation  
MEDICATIONS  (STANDING):  risperiDONE   Tablet 1 milliGRAM(s) Oral daily  risperiDONE   Tablet 2 milliGRAM(s) Oral at bedtime    MEDICATIONS  (PRN):  haloperidol     Tablet 2 milliGRAM(s) Oral every 8 hours PRN Mod Agitation  haloperidol    Injectable 5 milliGRAM(s) IntraMuscular every 6 hours PRN Sev Agitation  LORazepam     Tablet 1 milliGRAM(s) Oral every 8 hours PRN Mod Agitation  LORazepam   Injectable 2 milliGRAM(s) IntraMuscular every 8 hours PRN Sev Agitation  
MEDICATIONS  (STANDING):  risperiDONE  Disintegrating Tablet 1 milliGRAM(s) Oral daily  risperiDONE  Disintegrating Tablet 3 milliGRAM(s) Oral at bedtime    MEDICATIONS  (PRN):  haloperidol     Tablet 2 milliGRAM(s) Oral every 8 hours PRN Mod Agitation  haloperidol    Injectable 5 milliGRAM(s) IntraMuscular every 6 hours PRN Sev Agitation

## 2022-11-23 NOTE — BH INPATIENT PSYCHIATRY PROGRESS NOTE - NSTXDISORGINTERMD_PSY_ALL_CORE
To continue with Risperdal trial for improved Thought processes to make reasonable good decision. 
To continue with Risperdal trial for improved Thought processes to make reasonable good decision. Risperdal increased to 1 mg BID, now on Risperdal 1 mg AM and Risperdal 3 mg HS.
To continue with Risperdal trial for improved Thought processes to make reasonable good decision. 
To continue with Risperdal trial for improved Thought processes to make reasonable good decision. 
To continue with Risperdal trial for improved Thought processes to make reasonable good decision. Risperdal increased to 1 mg BID, now on Risperdal 1 mg AM and Risperdal 3 mg HS.
To continue with Risperdal trial for improved Thought processes to make reasonable good decision. Risperdal increased to 1 mg BID, now on Risperdal 1 mg AM and Risperdal 3 mg HS.

## 2022-11-23 NOTE — BH INPATIENT PSYCHIATRY DISCHARGE NOTE - NSDCMRMEDTOKEN_GEN_ALL_CORE_FT
RisperDAL 1 mg oral tablet: 1 tab(s) orally once a day   RisperDAL 3 mg oral tablet: 1 tab(s) orally once a day (at bedtime)

## 2022-11-23 NOTE — BH INPATIENT PSYCHIATRY PROGRESS NOTE - NSCGISEVERILLNESS_PSY_ALL_CORE
3 = Mildly ill – clearly established symptoms with minimal, if any, distress or difficulty in social and occupational function
5 = Markedly ill - intrusive symptoms that distinctly impair social/occupational function or cause intrusive levels of distress
4 = Moderately ill – overt symptoms causing noticeable, but modest, functional impairment or distress; symptom level may warrant medication
3 = Mildly ill – clearly established symptoms with minimal, if any, distress or difficulty in social and occupational function
3 = Mildly ill – clearly established symptoms with minimal, if any, distress or difficulty in social and occupational function
5 = Markedly ill - intrusive symptoms that distinctly impair social/occupational function or cause intrusive levels of distress

## 2022-11-23 NOTE — BH INPATIENT PSYCHIATRY DISCHARGE NOTE - NSDCPROCEDURESFT_PSY_ALL_CORE
CBC-WNL  Chem-WNL  U-Tox --Negative  HBA1-C-5.2  TSH-1.12    Lipid Profile  LDL-44  HDL-87  TRG-76    COVID-19-Negative

## 2022-11-23 NOTE — BH INPATIENT PSYCHIATRY PROGRESS NOTE - NSBHCONSULTIPREASON_PSY_A_CORE
other reason

## 2022-11-23 NOTE — BH INPATIENT PSYCHIATRY PROGRESS NOTE - NSCGIIMPROVESX_PSY_ALL_CORE
2 = Much improved - notably better with signficant reduction of symptoms; increase in the level of functioning but some symptoms remain
3 = Minimally improved - slightly better with little or no clinically meaningful reduction of symptoms.  Represents very little change in basic clinical status, level of care, or functional capacity.
2 = Much improved - notably better with signficant reduction of symptoms; increase in the level of functioning but some symptoms remain
2 = Much improved - notably better with signficant reduction of symptoms; increase in the level of functioning but some symptoms remain

## 2022-11-23 NOTE — BH INPATIENT PSYCHIATRY PROGRESS NOTE - NSTXDCOTHRINTERMD_PSY_ALL_CORE
Risperdal o.5 mg BID, now increased to Risperdal 1 mg BID
Risperdal 0.5 mg BID, later increased to Risperdal 1 mg BID, now on Risperdal 1 mg AM and Risperdal 3 mg HS.
Risperdal o.5 mg BID, now increased to Risperdal 1 mg BID
Risperdal o.5 mg BID, now increased to Risperdal 1 mg BID, now Risperdal increased to 1 mg BID, now on Risperdal 1 mg AM and Risperdal 3 mg HS.
Risperdal 0.5 mg BID, later increased to Risperdal 1 mg BID, now on Risperdal 1 mg AM and Risperdal 3 mg HS.
Risperdal o.5 mg BID, now increased to Risperdal 1 mg BID

## 2022-11-23 NOTE — BH INPATIENT PSYCHIATRY PROGRESS NOTE - NSBHMSEPERCEPT_PSY_A_CORE
Pt has seen Cardiology
No abnormalities
Auditory hallucinations
Auditory hallucinations
No abnormalities
Auditory hallucinations
Auditory hallucinations
No abnormalities

## 2022-11-23 NOTE — BH INPATIENT PSYCHIATRY PROGRESS NOTE - NSBHMSEBEHAV_PSY_A_CORE
Cooperative/Other
Cooperative
Other
Cooperative
Cooperative
Cooperative/Other
Cooperative
Cooperative/Other
Other

## 2022-11-23 NOTE — BH INPATIENT PSYCHIATRY PROGRESS NOTE - NSBHFUPINTERVALHXFT_PSY_A_CORE
11/21/2022: Patient was seen today AM, chart reviewed and discussed in team. he was cheeking meds before and nursing found a pill on the floor. Oral Risperdal now changed to M-Tab with M-tab 1 mg in AM and M-tab 3 mg HS. He was earlier mentioned to be discharge on 11/22, but as he was cheeking it was decided to delay for a day and writer spoke with family (Mother and younger brother) who informs that he is usually a quiet person and they feels that he is much better and has no hesitation for him to come home. he endorses that he sleeps well and also eating well. To continue with Risperdal trail for now. SW was informed to prepare discharge on Wednesday.    11/18/2022: Patient was seen today AM, chart reviewed and discussed in team. He is meds complaint and seems to be progressing well, he is receptive now, and able to understand a little, he was not apprehensive or defiant to leave which he was commenting about and tried to follow the writer and agreed to stay till Tuesday. He has fair to good sleep/appetite, no PRN IM Meds needed, prefers to stay in room. Was advised to take meds for a long time. No meds induced s/e noted.        11/17/22: Patient was seen today AM, chart reviewed and discussed in team. He was in his room got up fast on mentioning his name,. endorses that he is feeling much better and does not have to stay here. He was advised that he is here for 3-4 days only and it' too early for discharge and was told that he would need time and would go home probably next week, if all goes well. His got upset, annoyed  and started to follow the writer 2 times to the nursing station. Later he started to call his mother and was endorsing that he did not came here by himself, and writer has no authority to keep him and he can go anytime today. Risperdal increased to 3 mg daily.    Covering note 11/16/2022 Pt seen today he is preoccupied,  guarded and with poor eye contact.   Pt is avoidant and was pacing the hallway and was reluctant to continue with visit with his brother.  Will adjust Risperdal dose.       11/15/2022: Patient was seen today AM, chart reviewed and discussed in team. He is pre-occupied with going back to work, and as per his mother he is not working at this time, and also acts bizarre, sitting on the floor for prolonged period, was advised to take meds, Risperdal further increased to 1 mg BID for improved/adequate symptom control. fair to good sleep/appetite, no group participation    Patient was seen in his room today AM, chart reviewed and discussed in team. He is up and about, somewhat hesitant and comes forward when mentioning his name with a flat affect that he feels better and has work to do on Wednesday and thus has to leave today. Writer was the person who admitted him initially and in ED yesterday he was not speaking or communicating. When asked why he closed the door he endorses that he was upset with his brother. He took meds as suggested and was calling his mother to pick him up. Writer earlier spoke with his mother/Cousin and they are aware of his admission to the unit for stability/safety.
covering note 11/16/2022 Pt seen today he is preoccupied,  guarded and with poor eye contact.   Pt is avoidant and was pacing the hallway and was reluctant to continue with visit with his brother.  Will adjust risperdal dose.       11/15/2022: Patient was seen today AM, chart reviewed and discussed in team. He is pre-occupied with going back to work, and as per his mother he is not working at this time, and also acts bizarre, sitting on the floor for prolonged period, was advised to take meds, Risperdal further increased to 1 mg BID for improved/adequate symptom control. fair to good sleep/appetite, no group participation    Patient was seen in his room today AM, chart reviewed and discussed in team. He is up and about, somewhat hesitant and comes forward when mentioning his name with a flat affect that he feels better and has work to do on Wednesday and thus has to leave today. Writer was the person who admitted him initially and in ED yesterday he was not speaking or communicating. When asked why he closed the door he endorses that he was upset with his brother. He took meds as suggested and was calling his mother to pick him up. Writer earlier spoke with his mother/Cousin and they are aware of his admission to the unit for stability/safety.
Patient was seen in his room today AM, chart reviewed and discussed in team. He is up and about, somewhat hesitant and comes forward when mentioning his name with a flat affect that he feels better and has work to do on Wednesday and thus has to leave today. Writer was the person who admitted him initially and in ED yesterday he was not speaking or communicating. When asked why he closed the door he endorses that he was upset with his brother. He took meds as suggested and was calling his mother to pick him up. Writer earlier spoke with his mother/Cousin and they are aware of his admission to the unit for stability/safety.
covering MD note  Increased the Risperdal to total of 2mg po bid.  Pt still with poor eye contact , avoidant  and withdrawn. Pt is guarded , anxious, suspicious, hypervigilant. Pt was stammering and unable to verbalize why he was so oddly related. 
Patient was seen today AM, mood in control and takes meds as suggested. He was advised to stay on meds for at least 6 months and to take meds regularly and to continue appropriate f/u as suggested by [place]. Meds were send to pharmacy as suggested. Significant improvement since his ED visit but need to continue for adequate stability/safety and sustenance. he would be picked up by his parents/brother.
11/22/2022: Patient was seen today AM, chart reviewed and discussed in team. He is meds compliant and has been doing well with no issues, was seen out side of the room today AM and answers to questions fairly well. His mother/brother thinks that he doing well and had good conversation with them. No meds chnages now, discharge planning tomorrow.        11/21/2022: Patient was seen today AM, chart reviewed and discussed in team. he was cheeking meds before and nursing found a pill on the floor. Oral Risperdal now changed to M-Tab with M-tab 1 mg in AM and M-tab 3 mg HS. He was earlier mentioned to be discharge on 11/22, but as he was cheeking it was decided to delay for a day and writer spoke with family (Mother and younger brother) who informs that he is usually a quiet person and they feels that he is much better and has no hesitation for him to come home. he endorses that he sleeps well and also eating well. To continue with Risperdal trail for now. SW was informed to prepare discharge on Wednesday.    11/18/2022: Patient was seen today AM, chart reviewed and discussed in team. He is meds complaint and seems to be progressing well, he is receptive now, and able to understand a little, he was not apprehensive or defiant to leave which he was commenting about and tried to follow the writer and agreed to stay till Tuesday. He has fair to good sleep/appetite, no PRN IM Meds needed, prefers to stay in room. Was advised to take meds for a long time. No meds induced s/e noted.        11/17/22: Patient was seen today AM, chart reviewed and discussed in team. He was in his room got up fast on mentioning his name,. endorses that he is feeling much better and does not have to stay here. He was advised that he is here for 3-4 days only and it' too early for discharge and was told that he would need time and would go home probably next week, if all goes well. His got upset, annoyed  and started to follow the writer 2 times to the nursing station. Later he started to call his mother and was endorsing that he did not came here by himself, and writer has no authority to keep him and he can go anytime today. Risperdal increased to 3 mg daily.    Covering note 11/16/2022 Pt seen today he is preoccupied,  guarded and with poor eye contact.   Pt is avoidant and was pacing the hallway and was reluctant to continue with visit with his brother.  Will adjust Risperdal dose.       11/15/2022: Patient was seen today AM, chart reviewed and discussed in team. He is pre-occupied with going back to work, and as per his mother he is not working at this time, and also acts bizarre, sitting on the floor for prolonged period, was advised to take meds, Risperdal further increased to 1 mg BID for improved/adequate symptom control. fair to good sleep/appetite, no group participation    Patient was seen in his room today AM, chart reviewed and discussed in team. He is up and about, somewhat hesitant and comes forward when mentioning his name with a flat affect that he feels better and has work to do on Wednesday and thus has to leave today. Writer was the person who admitted him initially and in ED yesterday he was not speaking or communicating. When asked why he closed the door he endorses that he was upset with his brother. He took meds as suggested and was calling his mother to pick him up. Writer earlier spoke with his mother/Cousin and they are aware of his admission to the unit for stability/safety.
11/17/22: Patient was seen today AM, chart reviewed and discussed in team. He was in his room got up fast on mentioning his name,. endorses that he is feeling much better and does not have to stay here. He was advised that he is here for 3-4 days only and it' too early for discharge and was told that he would need time and would go home probably next week, if all goes well. His got upset, annoyed  and started to follow the writer 2 times to the nursing station. Later he started to call his mother and was endorsing that he did not came here by himself, and writer has no authority to keep him and he can go anytime today. Risperdal increased to 3 mg daily.    Covering note 11/16/2022 Pt seen today he is preoccupied,  guarded and with poor eye contact.   Pt is avoidant and was pacing the hallway and was reluctant to continue with visit with his brother.  Will adjust Risperdal dose.       11/15/2022: Patient was seen today AM, chart reviewed and discussed in team. He is pre-occupied with going back to work, and as per his mother he is not working at this time, and also acts bizarre, sitting on the floor for prolonged period, was advised to take meds, Risperdal further increased to 1 mg BID for improved/adequate symptom control. fair to good sleep/appetite, no group participation    Patient was seen in his room today AM, chart reviewed and discussed in team. He is up and about, somewhat hesitant and comes forward when mentioning his name with a flat affect that he feels better and has work to do on Wednesday and thus has to leave today. Writer was the person who admitted him initially and in ED yesterday he was not speaking or communicating. When asked why he closed the door he endorses that he was upset with his brother. He took meds as suggested and was calling his mother to pick him up. Writer earlier spoke with his mother/Cousin and they are aware of his admission to the unit for stability/safety.
11/18/2022: Patient was seen today AM, chart reviewed and discussed in team. He is meds complaint and seems to be progressing well, he is receptive now, and able to understand a little, he was not apprehensive or defiant to leave which he was commenting about and tried to follow the writer and agreed to stay till Tuesday. He has fair to good sleep/appetite, no PRN IM Meds needed, prefers to stay in room. Was advised to take meds for a long time. No meds induced s/e noted.        11/17/22: Patient was seen today AM, chart reviewed and discussed in team. He was in his room got up fast on mentioning his name,. endorses that he is feeling much better and does not have to stay here. He was advised that he is here for 3-4 days only and it' too early for discharge and was told that he would need time and would go home probably next week, if all goes well. His got upset, annoyed  and started to follow the writer 2 times to the nursing station. Later he started to call his mother and was endorsing that he did not came here by himself, and writer has no authority to keep him and he can go anytime today. Risperdal increased to 3 mg daily.    Covering note 11/16/2022 Pt seen today he is preoccupied,  guarded and with poor eye contact.   Pt is avoidant and was pacing the hallway and was reluctant to continue with visit with his brother.  Will adjust Risperdal dose.       11/15/2022: Patient was seen today AM, chart reviewed and discussed in team. He is pre-occupied with going back to work, and as per his mother he is not working at this time, and also acts bizarre, sitting on the floor for prolonged period, was advised to take meds, Risperdal further increased to 1 mg BID for improved/adequate symptom control. fair to good sleep/appetite, no group participation    Patient was seen in his room today AM, chart reviewed and discussed in team. He is up and about, somewhat hesitant and comes forward when mentioning his name with a flat affect that he feels better and has work to do on Wednesday and thus has to leave today. Writer was the person who admitted him initially and in ED yesterday he was not speaking or communicating. When asked why he closed the door he endorses that he was upset with his brother. He took meds as suggested and was calling his mother to pick him up. Writer earlier spoke with his mother/Cousin and they are aware of his admission to the unit for stability/safety.
11/15/2022: Patient was seen today AM, chart reviewed and discussed in team. He is pre-occupied with going back to work, and as per his mother he is not working at this time, and also acts bizarre, sitting on the floor for prolonged period, was advised to take meds, Risperdal further increased to 1 mg BID for improved/adequate symptom control. fair to good sleep/appetite, no group participation    Patient was seen in his room today AM, chart reviewed and discussed in team. He is up and about, somewhat hesitant and comes forward when mentioning his name with a flat affect that he feels better and has work to do on Wednesday and thus has to leave today. Writer was the person who admitted him initially and in ED yesterday he was not speaking or communicating. When asked why he closed the door he endorses that he was upset with his brother. He took meds as suggested and was calling his mother to pick him up. Writer earlier spoke with his mother/Cousin and they are aware of his admission to the unit for stability/safety.

## 2022-11-23 NOTE — BH INPATIENT PSYCHIATRY DISCHARGE NOTE - HPI (INCLUDE ILLNESS QUALITY, SEVERITY, DURATION, TIMING, CONTEXT, MODIFYING FACTORS, ASSOCIATED SIGNS AND SYMPTOMS)
Patient a 31 y/o single Chinese male, domiciled with parents/brother, employed in [employer], no prior Psychiatric issues, no prior SI/SA, denied drug abuse hx, no Aggression/Agitation was BIB/Family due to bizarre behavior.    Patient in bed, alert, eyes open, putting his left hand to cover the mouth and not speaking. Patient in no distress, and on 1:1 for safety, and most of the information was obtained from his mother and 1:1 .     As per his mother she endorses that he is acting bizarre, screaming at times, screamed last night and they brought him here at  ED. His mother further mentioned that he is not sleeping/ poor eating habits, prefers mostly vegetables. As per the  he shouts or screams and repeat any words 3 times in a row before he mentions another topic and does the same repeating 3 times everything he says. He endorses .. " I'm free" , I'm good ", I'm sorry "..3 times every time. He has been doing this for past 1-2 months. He also endorses that he does not know what to say, does not know how to restrain himself, and I didn't mean to be disrespectful " etc. As per chart review he is talking to self, barricade himself in the room and his brother has to break open the door. He is also selectively mute and initially denied current S/H/I/P. His mother denied prior Psychiatric Intervention, no prior SI/SA, no drug abuse hx. and no medical issues.

## 2022-11-23 NOTE — BH INPATIENT PSYCHIATRY PROGRESS NOTE - NSBHPSYCHOLCOGORIENT_PSY_A_CORE
Mute/Not fully oriented...
Mute/Oriented to time, place, person, situation
Mute/Not fully oriented...
Mute/Not fully oriented...
Mute/Oriented to time, place, person, situation
Mute/Not fully oriented...
Mute/Oriented to time, place, person, situation

## 2022-11-23 NOTE — BH INPATIENT PSYCHIATRY PROGRESS NOTE - NSBHMETABOLIC_PSY_ALL_CORE_FT
BMI: BMI (kg/m2): 26.9 (11-13-22 @ 03:09)  HbA1c: A1C with Estimated Average Glucose Result: 5.2 % (11-14-22 @ 08:33  TSH-1.12  Lipid Panel: Date/Time: 11-14-22 @ 08:33  Cholesterol, Serum: 147  Direct LDL: --44  HDL Cholesterol, Serum: 87  Triglycerides, Serum: 76  
BMI: BMI (kg/m2): 26.9 (11-13-22 @ 03:09)  HbA1c: A1C with Estimated Average Glucose Result: 5.2 % (11-14-22 @ 08:33)    Glucose:   BP: --  Lipid Panel: Date/Time: 11-14-22 @ 08:33  Cholesterol, Serum: 147  Direct LDL: --  HDL Cholesterol, Serum: 87  Total Cholesterol/HDL Ration Measurement: --  Triglycerides, Serum: 76  
BMI: BMI (kg/m2): 26.9 (11-13-22 @ 03:09)  HbA1c: A1C with Estimated Average Glucose Result: 5.2 % (11-14-22 @ 08:33)    Glucose:   BP: 133/95 (11-13-22 @ 15:53) (133/95 - 154/99)  Lipid Panel: Date/Time: 11-14-22 @ 08:33  Cholesterol, Serum: 147  Direct LDL: --  HDL Cholesterol, Serum: 87  Total Cholesterol/HDL Ration Measurement: --  Triglycerides, Serum: 76  
BMI: BMI (kg/m2): 26.9 (11-13-22 @ 03:09)  HbA1C: A1C with Estimated Average Glucose Result: 5.2 % (11-14-22 @ 08:33)  TSH-1.12        Lipid Panel: Date/Time: 11-14-22 @ 08:33  Cholesterol, Serum: 147  Direct LDL: 44  HDL Cholesterol, Serum: 87  Triglycerides, Serum: 76  
BMI: BMI (kg/m2): 26.9 (11-13-22 @ 03:09)  HbA1c: A1C with Estimated Average Glucose Result: 5.2 % (11-14-22 @ 08:33)    Glucose:   BP: --  Lipid Panel: Date/Time: 11-14-22 @ 08:33  Cholesterol, Serum: 147  Direct LDL: --  HDL Cholesterol, Serum: 87  Total Cholesterol/HDL Ration Measurement: --  Triglycerides, Serum: 76  
BMI: BMI (kg/m2): 26.9 (11-13-22 @ 03:09)  HbA1c: A1C with Estimated Average Glucose Result: 5.2 % (11-14-22 @ 08:33)    Glucose:   BP: 133/95 (11-13-22 @ 15:53) (133/95 - 154/99)  Lipid Panel: Date/Time: 11-14-22 @ 08:33  Cholesterol, Serum: 147  Direct LDL: --  HDL Cholesterol, Serum: 87  Total Cholesterol/HDL Ration Measurement: --  Triglycerides, Serum: 76  
BMI: BMI (kg/m2): 26.9 (11-13-22 @ 03:09)  HbA1c: A1C with Estimated Average Glucose Result: 5.2 % (11-14-22 @ 08:33)    Glucose:   BP: --  Lipid Panel: Date/Time: 11-14-22 @ 08:33  Cholesterol, Serum: 147  Direct LDL: --  HDL Cholesterol, Serum: 87  Total Cholesterol/HDL Ration Measurement: --  Triglycerides, Serum: 76

## 2022-11-23 NOTE — BH INPATIENT PSYCHIATRY PROGRESS NOTE - PRN MEDS
MEDICATIONS  (PRN):  haloperidol     Tablet 2 milliGRAM(s) Oral every 8 hours PRN Mod Agitation  haloperidol    Injectable 5 milliGRAM(s) IntraMuscular every 6 hours PRN Sev Agitation  
MEDICATIONS  (PRN):  haloperidol     Tablet 2 milliGRAM(s) Oral every 8 hours PRN Mod Agitation  haloperidol    Injectable 5 milliGRAM(s) IntraMuscular every 6 hours PRN Sev Agitation  LORazepam     Tablet 1 milliGRAM(s) Oral every 8 hours PRN Mod Agitation  LORazepam   Injectable 2 milliGRAM(s) IntraMuscular every 8 hours PRN Sev Agitation  
MEDICATIONS  (PRN):  haloperidol     Tablet 2 milliGRAM(s) Oral every 8 hours PRN Mod Agitation  haloperidol    Injectable 5 milliGRAM(s) IntraMuscular every 6 hours PRN Sev Agitation  
MEDICATIONS  (PRN):  haloperidol     Tablet 2 milliGRAM(s) Oral every 8 hours PRN Mod Agitation  haloperidol    Injectable 5 milliGRAM(s) IntraMuscular every 6 hours PRN Sev Agitation  
MEDICATIONS  (PRN):  haloperidol     Tablet 2 milliGRAM(s) Oral every 8 hours PRN Mod Agitation  haloperidol    Injectable 5 milliGRAM(s) IntraMuscular every 6 hours PRN Sev Agitation  LORazepam     Tablet 1 milliGRAM(s) Oral every 8 hours PRN Mod Agitation  LORazepam   Injectable 2 milliGRAM(s) IntraMuscular every 8 hours PRN Sev Agitation  

## 2022-11-23 NOTE — BH INPATIENT PSYCHIATRY PROGRESS NOTE - NSDCCRITERIA_PSY_ALL_CORE
No longer Psychotic on discharge
No pertinent past medical history
No longer Psychotic on discharge

## 2022-11-23 NOTE — BH INPATIENT PSYCHIATRY PROGRESS NOTE - NSTXCOPEINTERMD_PSY_ALL_CORE
Meds compliant started on Risperdal 0.5 mg BID and to titrate as needed for stability/safety
Meds compliant started on Risperdal 0.5 mg BID and to titrate as needed for stability/safety, Risperdal increased to 1 mg BID
Meds compliant started on Risperdal 0.5 mg BID and to titrate as needed for stability/safety, Risperdal increased to 1 mg BID, now on Risperdal 1 mg AM and Risperdal 3 mg HS.
Meds compliant started on Risperdal 0.5 mg BID and to titrate as needed for stability/safety, Risperdal increased to 1 mg BID
Meds compliant started on Risperdal 0.5 mg BID and to titrate as needed for stability/safety, Risperdal increased to 1 mg BID, now on Risperdal 1 mg AM and Risperdal 3 mg HS.
Meds compliant started on Risperdal 0.5 mg BID and to titrate as needed for stability/safety, Risperdal increased to 1 mg BID
Meds compliant started on Risperdal 0.5 mg BID and to titrate as needed for stability/safety, Risperdal increased to 1 mg BID, now on Risperdal 1 mg AM and Risperdal 3 mg HS.

## 2022-11-23 NOTE — BH INPATIENT PSYCHIATRY PROGRESS NOTE - NSTXCOPEDATEEST_PSY_ALL_CORE
14-Nov-2022
21-Nov-2022
14-Nov-2022
14-Nov-2022
21-Nov-2022
14-Nov-2022
21-Nov-2022
14-Nov-2022
14-Nov-2022

## 2022-11-23 NOTE — BH INPATIENT PSYCHIATRY PROGRESS NOTE - NSTXCOPEDATETRGT_PSY_ALL_CORE
Infant on Bubble CPAP +6 21% FiO2. Switched between prongs/mask with cares. NPO. Mom and dad briefly at bedside. Will continue to monitor.   
21-Nov-2022
28-Nov-2022
21-Nov-2022
28-Nov-2022
21-Nov-2022
28-Nov-2022
21-Nov-2022

## 2022-11-23 NOTE — BH INPATIENT PSYCHIATRY DISCHARGE NOTE - NSDCCPCAREPLAN_GEN_ALL_CORE_FT
PRINCIPAL DISCHARGE DIAGNOSIS  Diagnosis: Psychosis  Assessment and Plan of Treatment: Risperdal 1 mg AM and Risperdal 3 mg HS

## 2022-11-23 NOTE — BH INPATIENT PSYCHIATRY PROGRESS NOTE - NSBHMSEBODY_PSY_A_CORE
Overweight
Average build
Overweight

## 2022-11-23 NOTE — BH INPATIENT PSYCHIATRY PROGRESS NOTE - NSBHCHARTREVIEWVS_PSY_A_CORE FT
Vital Signs Last 24 Hrs  T(C): 36.9 (11-16-22 @ 07:49), Max: 36.9 (11-16-22 @ 07:49)  T(F): 98.4 (11-16-22 @ 07:49), Max: 98.4 (11-16-22 @ 07:49)  HR: --  BP: --  BP(mean): --  RR: 18 (11-16-22 @ 07:49) (18 - 18)  SpO2: 100% (11-16-22 @ 07:49) (100% - 100%)    Orthostatic VS  11-16-22 @ 07:49  Lying BP: --/-- HR: --  Sitting BP: 124/83 HR: 84  Standing BP: 102/64 HR: 93  Site: upper right arm  Mode: electronic  Orthostatic VS  11-15-22 @ 08:02  Lying BP: --/-- HR: --  Sitting BP: 158/93 HR: 107  Standing BP: 150/91 HR: 96  Site: upper right arm  Mode: electronic  
Vital Signs Last 24 Hrs  T(C): 36.6 (11-23-22 @ 07:26), Max: 36.6 (11-23-22 @ 07:26)  T(F): 97.8 (11-23-22 @ 07:26), Max: 97.8 (11-23-22 @ 07:26)  HR: --  BP: --  BP(mean): --  RR: 18 (11-23-22 @ 07:26) (18 - 18)  SpO2: 100% (11-23-22 @ 07:26) (100% - 100%)    Orthostatic VS  11-23-22 @ 07:26  Lying BP: --/-- HR: --  Sitting BP: 131/84 HR: 82  Standing BP: 133/79 HR: 93  Site: upper right arm  Mode: electronic  Orthostatic VS  11-22-22 @ 07:51  Lying BP: --/-- HR: --  Sitting BP: 139/83 HR: 88  Standing BP: 131/87 HR: 90  Site: upper right arm  Mode: electronic  
Vital Signs Last 24 Hrs  T(C): 36.7 (11-22-22 @ 07:51), Max: 36.7 (11-22-22 @ 07:51)  T(F): 98 (11-22-22 @ 07:51), Max: 98 (11-22-22 @ 07:51)  HR: --  BP: --  BP(mean): --  RR: 18 (11-22-22 @ 07:51) (18 - 18)  SpO2: 100% (11-22-22 @ 07:51) (100% - 100%)    Orthostatic VS  11-22-22 @ 07:51  Lying BP: --/-- HR: --  Sitting BP: 139/83 HR: 88  Standing BP: 131/87 HR: 90  Site: upper right arm  Mode: electronic  Orthostatic VS  11-21-22 @ 07:21  Lying BP: --/-- HR: --  Sitting BP: 127/84 HR: 81  Standing BP: 125/75 HR: 81  Site: upper right arm  Mode: electronic  
Vital Signs Last 24 Hrs  T(C): 36.7 (11-15-22 @ 08:02), Max: 36.7 (11-15-22 @ 08:02)  T(F): 98 (11-15-22 @ 08:02), Max: 98 (11-15-22 @ 08:02)  HR: --  BP: --  BP(mean): --  RR: 18 (11-15-22 @ 08:02) (18 - 18)  SpO2: 96% (11-15-22 @ 08:02) (96% - 96%)    Orthostatic VS  11-15-22 @ 08:02  Lying BP: --/-- HR: --  Sitting BP: 158/93 HR: 107  Standing BP: 150/91 HR: 96  Site: upper right arm  Mode: electronic  Orthostatic VS  11-14-22 @ 07:57  Lying BP: --/-- HR: --  Sitting BP: 101/98 HR: 100  Standing BP: 120/102 HR: 100  Site: upper right arm  Mode: electronic  Orthostatic VS  11-13-22 @ 16:47  Lying BP: --/-- HR: --  Sitting BP: 145/91 HR: 90  Standing BP: 141/92 HR: 94  Site: --  Mode: --  
Vital Signs Last 24 Hrs  T(C): 36.4 (11-14-22 @ 07:57), Max: 36.8 (11-13-22 @ 15:37)  T(F): 97.6 (11-14-22 @ 07:57), Max: 98.3 (11-13-22 @ 15:37)  HR: 84 (11-13-22 @ 15:37) (84 - 84)  BP: 133/95 (11-13-22 @ 15:53) (133/95 - 148/104)  BP(mean): 117 (11-13-22 @ 15:37) (117 - 117)  RR: 18 (11-14-22 @ 07:57) (16 - 18)  SpO2: 100% (11-14-22 @ 07:57) (98% - 100%)    Orthostatic VS  11-14-22 @ 07:57  Lying BP: --/-- HR: --  Sitting BP: 101/98 HR: 100  Standing BP: 120/102 HR: 100  Site: upper right arm  Mode: electronic  Orthostatic VS  11-13-22 @ 16:47  Lying BP: --/-- HR: --  Sitting BP: 145/91 HR: 90  Standing BP: 141/92 HR: 94  Site: --  Mode: --  
Vital Signs Last 24 Hrs  T(C): 36.3 (11-18-22 @ 06:59), Max: 36.3 (11-18-22 @ 06:59)  T(F): 97.4 (11-18-22 @ 06:59), Max: 97.4 (11-18-22 @ 06:59)  HR: --  BP: --  BP(mean): --  RR: 18 (11-18-22 @ 06:59) (18 - 18)  SpO2: 99% (11-18-22 @ 06:59) (99% - 99%)    Orthostatic VS  11-18-22 @ 06:59  Lying BP: --/-- HR: --  Sitting BP: 126/86 HR: 78  Standing BP: 131/87 HR: 86  Site: upper right arm  Mode: electronic  Orthostatic VS  11-17-22 @ 07:22  Lying BP: --/-- HR: --  Sitting BP: 135/90 HR: 91  Standing BP: 142/88 HR: 102  Site: upper right arm  Mode: electronic  
Vital Signs Last 24 Hrs  T(C): 36.7 (11-21-22 @ 07:21), Max: 36.7 (11-21-22 @ 07:21)  T(F): 98 (11-21-22 @ 07:21), Max: 98 (11-21-22 @ 07:21)  HR: --  BP: --  BP(mean): --  RR: 18 (11-21-22 @ 07:21) (18 - 18)  SpO2: 99% (11-21-22 @ 07:21) (99% - 99%)    Orthostatic VS  11-21-22 @ 07:21  Lying BP: --/-- HR: --  Sitting BP: 127/84 HR: 81  Standing BP: 125/75 HR: 81  Site: upper right arm  Mode: electronic  Orthostatic VS  11-20-22 @ 07:49  Lying BP: --/-- HR: --  Sitting BP: 126/95 HR: 74  Standing BP: 129/97 HR: 84  Site: upper right arm  Mode: electronic  
Vital Signs Last 24 Hrs  T(C): 36.7 (11-20-22 @ 07:49), Max: 36.7 (11-20-22 @ 07:49)  T(F): 98 (11-20-22 @ 07:49), Max: 98 (11-20-22 @ 07:49)  HR: --  BP: --  BP(mean): --  RR: 15 (11-20-22 @ 07:49) (15 - 15)  SpO2: 100% (11-20-22 @ 07:49) (100% - 100%)    Orthostatic VS  11-20-22 @ 07:49  Lying BP: --/-- HR: --  Sitting BP: 126/95 HR: 74  Standing BP: 129/97 HR: 84  Site: upper right arm  Mode: electronic  Orthostatic VS  11-19-22 @ 07:45  Lying BP: --/-- HR: --  Sitting BP: 156/89 HR: 82  Standing BP: 135/90 HR: 88  Site: upper right arm  Mode: electronic  
Vital Signs Last 24 Hrs  T(C): 36.3 (11-17-22 @ 07:22), Max: 36.3 (11-17-22 @ 07:22)  T(F): 97.4 (11-17-22 @ 07:22), Max: 97.4 (11-17-22 @ 07:22)  HR: --  BP: --  BP(mean): --  RR: 18 (11-17-22 @ 07:22) (18 - 18)  SpO2: 99% (11-17-22 @ 07:22) (99% - 99%)    Orthostatic VS  11-17-22 @ 07:22  Lying BP: --/-- HR: --  Sitting BP: 135/90 HR: 91  Standing BP: 142/88 HR: 102  Site: upper right arm  Mode: electronic  Orthostatic VS  11-16-22 @ 07:49  Lying BP: --/-- HR: --  Sitting BP: 124/83 HR: 84  Standing BP: 102/64 HR: 93  Site: upper right arm  Mode: electronic

## 2022-11-23 NOTE — BH INPATIENT PSYCHIATRY PROGRESS NOTE - NSICDXBHSECONDARYDX_PSY_ALL_CORE
Schizoaffective disorder   F25.9  

## 2022-11-23 NOTE — BH INPATIENT PSYCHIATRY PROGRESS NOTE - NSBHFUPINTERVALCCFT_PSY_A_CORE
"I'm alright"
"I'm feeling fine can I go today "
"I'm feeling fine can I go today "
" I'm feeling  much better now "
"I'm alright"
"I'm fine just need some water ", as pt holding out the pitcher to this interviewer with outstretched arm while turning his head away and averting eye contact. 
"I'm alright"

## 2022-11-23 NOTE — BH INPATIENT PSYCHIATRY PROGRESS NOTE - NSTXDCOTHRGOAL_PSY_ALL_CORE
Patient will be referred to the appropriate level of outpatient treatment and have appointments within 3-5 days of discharge.

## 2022-11-23 NOTE — BH INPATIENT PSYCHIATRY DISCHARGE NOTE - HOSPITAL COURSE
Patient was admitted involuntarily from ED at . Since his admission he was stared on Risperdal 0.5 mg BID, with eventual titration to Risperdal 2 mg daily and then to Risperdal 4 mg/day ( Risperdal 1 mg AM and Risperdal 3 mg HS). he was meds compliant from day-1, initially was hesitant and later he started to take the Risperdal. He once or twice was cheeking meds so was given Risperdal-M Tab. he was advised to refrain from cheekig meds so he remains stable with good sleep/appetite. He is a quiet person, limited friends, prefer to stay by self and talk less, feels that he seems guarded or uncooperative, which was later clarified that as per  brother he has limited to no friends, has a quiet demeanor and prefers to spend most of the time by himself. He recovered well and was more visible in community, no A/H reported, no delusional beliefs noted. he is able to communicate well, fair to good eye contact. U-tox is negative as he does not take any illicit drugs till now. He was never suicidal, never tried to commit suicide in the past and there were no instances of NSSIB on self.    Medically clear, no medical issues at this time.

## 2022-11-23 NOTE — BH INPATIENT PSYCHIATRY DISCHARGE NOTE - NSBHMETABOLIC_PSY_ALL_CORE_FT
BMI: BMI (kg/m2): 26.9 (11-13-22 @ 03:09)  HbA1c: A1C with Estimated Average Glucose Result: 5.2 % (11-14-22 @ 08:33)  TSH-1.12        Lipid Panel: Date/Time: 11-14-22 @ 08:33  Cholesterol, Serum: 147  Direct LDL: --44  HDL Cholesterol, Serum: 87  Triglycerides, Serum: 76

## 2022-11-23 NOTE — BH INPATIENT PSYCHIATRY PROGRESS NOTE - NSTXDISORGDATETRGT_PSY_ALL_CORE
20-Nov-2022

## 2022-11-23 NOTE — BH INPATIENT PSYCHIATRY PROGRESS NOTE - NSBHATTESTTYPEVISIT_PSY_A_CORE
Attending Only

## 2022-11-23 NOTE — BH INPATIENT PSYCHIATRY PROGRESS NOTE - NSTXDISORGDATEEST_PSY_ALL_CORE
13-Nov-2022

## 2022-11-25 NOTE — CHART NOTE - NSCHARTNOTEFT_GEN_A_CORE
Writer contacted patient to follow up after discharge. Direct contact made with patient who reported he made it home safely. Denies SI/HI, in no acute distress.

## 2022-11-28 DIAGNOSIS — F25.9 SCHIZOAFFECTIVE DISORDER, UNSPECIFIED: ICD-10-CM

## 2023-01-01 NOTE — BH PATIENT PROFILE - NSBHSUPPORTOTHER_PSY_ALL_CORE
Assessment: Most recent HUS on 10/2 with evolving right sided Grade 4 IVH, and Left sided Grade 2 IVH    Plan:  Obtain HUS weekly on Mondays--> next due 10/9  Follow up with head circumference daily--> 27.7cm stable  Monitor events, interventions and neuro status   No

## 2023-03-31 NOTE — ED STATDOCS - CROS ED RESP ALL NEG
EEG TECHNICIAN NOTE      NAME: Mckay Richardson YOB: 1963  MRN:    [id number]  DATE:  03/31/23    ASSESSMENT     Patient assessed for:    [x]Skin Breakdown:   1. [x] Skin WNL   2. [] Breakdown noted    A. RN Notified     B. Leads Repositioned   [x]Lead Placement:   1.[x] All leads in Place   2.[] Leads need Attention    A. Leads Replaced, Repositioned and Head Wrapped as needed.  [x] System Setup:   1. [x]System running as expected.   2. [] System Rebooted technical support called    3. [] technical support called     [] Restock Equipment:   1. [] Restocked    [x] Monitoring    1. [] Continued    2. [x] Discontinued    Summary     Removed leads and cleaned head. No skin breakdown.   Runtime 32hrs 22mins          Rudy Burrows       EEG TECHNICIAN       negative...

## 2023-08-15 NOTE — ED PROCEDURE NOTE - NS ED FORMED SPLINTS 1
Called patient to set up mammogram screening. No answer. Left message to call us back.  
Posterior Splint

## 2023-11-14 NOTE — ED ADULT TRIAGE NOTE - ARRIVAL FROM
Medication: Atorvastatin  Last office visit date: 2/6/23  Next appointment scheduled?: Yes   Number of refills given: 1    Upcoming appointment scheduled on: 2/7/2024   Per last office visit, patient is to follow up in one year.   Last refill on: 11/21/22  Hmg CoA Reductase Inhibitors (Statin) Refill Protocol - 12 Month Protocol Passed 11/13/2023 11:10 PM   Protocol Details  Lipid Panel or Direct LDL resulted within last 15 months -- IF CRITERIA FAILED REFER TO PROTOCOL DETAILS    Patient is NOT on Gemfibrozil    Seen by prescribing provider or same department within the last 12 months or has a future appt in 3 months - IF FAILED PLEASE LOOK AT CHART REVIEW FOR LAST VISIT AND PROCEED ACCORDINGLY    Request is NOT for Simvastatin 80 mg or Vytorin 10-80 mg    Medication (including dose and sig) on current meds list       
Home

## 2023-12-20 NOTE — BH PATIENT PROFILE - FALL HARM RISK - FALLEN IN PAST
CONST: no fevers, no chills  EYES: no redness, no vision changes   HENT: no throat pain, no epistaxis  CV: no chest pain, no palpitations     RESP: no shortness of breath, no cough  ABD: no abdominal pain, no vomiting     : no dysuria, no hematuria   MSK: no muscle pain, no joint swelling     NEURO: no headache, no focal weakness or loss of sensation     SKIN:  no rash, no lacerations. No

## 2024-04-11 NOTE — ED PROVIDER NOTE - MUSCULOSKELETAL, MLM
previous_has_had_botox When Was Your Last Botox Treatment?: 01/2024 Spine appears normal, range of motion is not limited, no muscle or joint tenderness

## 2024-05-07 NOTE — BH SAFETY PLAN - INTERNAL COPING STRATEGY 1
On Unit:  Movies, music, healing room, exercise, crafts, aroma therapy, reading, games, puzzles.
Chu Escalante MD

## 2024-09-06 NOTE — ED ADULT NURSE NOTE - NS ED NOTE ABUSE RESPONSE YN
Yes

## 2025-03-03 ENCOUNTER — EMERGENCY (EMERGENCY)
Facility: HOSPITAL | Age: 35
LOS: 0 days | Discharge: HOME | End: 2025-03-03
Attending: EMERGENCY MEDICINE
Payer: COMMERCIAL

## 2025-03-03 VITALS
SYSTOLIC BLOOD PRESSURE: 132 MMHG | DIASTOLIC BLOOD PRESSURE: 82 MMHG | HEART RATE: 115 BPM | TEMPERATURE: 99 F | OXYGEN SATURATION: 99 % | RESPIRATION RATE: 20 BRPM

## 2025-03-03 VITALS
OXYGEN SATURATION: 98 % | WEIGHT: 253.97 LBS | TEMPERATURE: 98 F | DIASTOLIC BLOOD PRESSURE: 103 MMHG | RESPIRATION RATE: 18 BRPM | HEART RATE: 112 BPM | SYSTOLIC BLOOD PRESSURE: 149 MMHG

## 2025-03-03 DIAGNOSIS — J11.1 INFLUENZA DUE TO UNIDENTIFIED INFLUENZA VIRUS WITH OTHER RESPIRATORY MANIFESTATIONS: ICD-10-CM

## 2025-03-03 DIAGNOSIS — R51.9 HEADACHE, UNSPECIFIED: ICD-10-CM

## 2025-03-03 DIAGNOSIS — R52 PAIN, UNSPECIFIED: ICD-10-CM

## 2025-03-03 DIAGNOSIS — R40.0 SOMNOLENCE: ICD-10-CM

## 2025-03-03 DIAGNOSIS — I10 ESSENTIAL (PRIMARY) HYPERTENSION: ICD-10-CM

## 2025-03-03 LAB
ALBUMIN SERPL ELPH-MCNC: 4.3 G/DL — SIGNIFICANT CHANGE UP (ref 3.3–5)
ALP SERPL-CCNC: 73 U/L — SIGNIFICANT CHANGE UP (ref 40–120)
ALT FLD-CCNC: 127 U/L — HIGH (ref 12–78)
ANION GAP SERPL CALC-SCNC: 6 MMOL/L — SIGNIFICANT CHANGE UP (ref 5–17)
AST SERPL-CCNC: 60 U/L — HIGH (ref 15–37)
BASOPHILS # BLD AUTO: 0.02 K/UL — SIGNIFICANT CHANGE UP (ref 0–0.2)
BASOPHILS NFR BLD AUTO: 0.2 % — SIGNIFICANT CHANGE UP (ref 0–2)
BILIRUB SERPL-MCNC: 0.6 MG/DL — SIGNIFICANT CHANGE UP (ref 0.2–1.2)
BUN SERPL-MCNC: 10 MG/DL — SIGNIFICANT CHANGE UP (ref 7–23)
CALCIUM SERPL-MCNC: 10.1 MG/DL — SIGNIFICANT CHANGE UP (ref 8.5–10.1)
CHLORIDE SERPL-SCNC: 99 MMOL/L — SIGNIFICANT CHANGE UP (ref 96–108)
CO2 SERPL-SCNC: 26 MMOL/L — SIGNIFICANT CHANGE UP (ref 22–31)
CREAT SERPL-MCNC: 1.03 MG/DL — SIGNIFICANT CHANGE UP (ref 0.5–1.3)
EGFR: 99 ML/MIN/1.73M2 — SIGNIFICANT CHANGE UP
EOSINOPHIL # BLD AUTO: 0.04 K/UL — SIGNIFICANT CHANGE UP (ref 0–0.5)
EOSINOPHIL NFR BLD AUTO: 0.4 % — SIGNIFICANT CHANGE UP (ref 0–6)
FLUAV AG NPH QL: DETECTED
FLUBV AG NPH QL: SIGNIFICANT CHANGE UP
GLUCOSE SERPL-MCNC: 152 MG/DL — HIGH (ref 70–99)
HCT VFR BLD CALC: 45.3 % — SIGNIFICANT CHANGE UP (ref 39–50)
HGB BLD-MCNC: 15.8 G/DL — SIGNIFICANT CHANGE UP (ref 13–17)
IMM GRANULOCYTES # BLD AUTO: 0.06 K/UL — SIGNIFICANT CHANGE UP (ref 0–0.07)
IMM GRANULOCYTES NFR BLD AUTO: 0.6 % — SIGNIFICANT CHANGE UP (ref 0–0.9)
LACTATE SERPL-SCNC: 2.2 MMOL/L — HIGH (ref 0.7–2)
LYMPHOCYTES # BLD AUTO: 0.49 K/UL — LOW (ref 1–3.3)
LYMPHOCYTES NFR BLD AUTO: 5.1 % — LOW (ref 13–44)
MCHC RBC-ENTMCNC: 30.2 PG — SIGNIFICANT CHANGE UP (ref 27–34)
MCHC RBC-ENTMCNC: 34.9 G/DL — SIGNIFICANT CHANGE UP (ref 32–36)
MCV RBC AUTO: 86.5 FL — SIGNIFICANT CHANGE UP (ref 80–100)
MONOCYTES # BLD AUTO: 0.71 K/UL — SIGNIFICANT CHANGE UP (ref 0–0.9)
MONOCYTES NFR BLD AUTO: 7.3 % — SIGNIFICANT CHANGE UP (ref 2–14)
NEUTROPHILS # BLD AUTO: 8.34 K/UL — HIGH (ref 1.8–7.4)
NEUTROPHILS NFR BLD AUTO: 86.4 % — HIGH (ref 43–77)
NRBC # BLD AUTO: 0 K/UL — SIGNIFICANT CHANGE UP (ref 0–0)
NRBC # FLD: 0 K/UL — SIGNIFICANT CHANGE UP (ref 0–0)
NRBC BLD AUTO-RTO: 0 /100 WBCS — SIGNIFICANT CHANGE UP (ref 0–0)
PLATELET # BLD AUTO: 250 K/UL — SIGNIFICANT CHANGE UP (ref 150–400)
PMV BLD: 8.6 FL — SIGNIFICANT CHANGE UP (ref 7–13)
POTASSIUM SERPL-MCNC: 3.8 MMOL/L — SIGNIFICANT CHANGE UP (ref 3.5–5.3)
POTASSIUM SERPL-SCNC: 3.8 MMOL/L — SIGNIFICANT CHANGE UP (ref 3.5–5.3)
PROT SERPL-MCNC: 8.3 GM/DL — SIGNIFICANT CHANGE UP (ref 6–8.3)
RBC # BLD: 5.24 M/UL — SIGNIFICANT CHANGE UP (ref 4.2–5.8)
RBC # FLD: 11.9 % — SIGNIFICANT CHANGE UP (ref 10.3–14.5)
RSV RNA NPH QL NAA+NON-PROBE: SIGNIFICANT CHANGE UP
SARS-COV-2 RNA SPEC QL NAA+PROBE: SIGNIFICANT CHANGE UP
SODIUM SERPL-SCNC: 131 MMOL/L — LOW (ref 135–145)
WBC # BLD: 9.66 K/UL — SIGNIFICANT CHANGE UP (ref 3.8–10.5)
WBC # FLD AUTO: 9.66 K/UL — SIGNIFICANT CHANGE UP (ref 3.8–10.5)

## 2025-03-03 PROCEDURE — 0241U: CPT

## 2025-03-03 PROCEDURE — 80053 COMPREHEN METABOLIC PANEL: CPT

## 2025-03-03 PROCEDURE — 36415 COLL VENOUS BLD VENIPUNCTURE: CPT

## 2025-03-03 PROCEDURE — 99284 EMERGENCY DEPT VISIT MOD MDM: CPT

## 2025-03-03 PROCEDURE — 99284 EMERGENCY DEPT VISIT MOD MDM: CPT | Mod: 25

## 2025-03-03 PROCEDURE — 96374 THER/PROPH/DIAG INJ IV PUSH: CPT

## 2025-03-03 PROCEDURE — 85025 COMPLETE CBC W/AUTO DIFF WBC: CPT

## 2025-03-03 PROCEDURE — 83605 ASSAY OF LACTIC ACID: CPT

## 2025-03-03 RX ORDER — IBUPROFEN 200 MG
600 TABLET ORAL ONCE
Refills: 0 | Status: COMPLETED | OUTPATIENT
Start: 2025-03-03 | End: 2025-03-03

## 2025-03-03 RX ORDER — ACETAMINOPHEN 500 MG/5ML
1000 LIQUID (ML) ORAL ONCE
Refills: 0 | Status: COMPLETED | OUTPATIENT
Start: 2025-03-03 | End: 2025-03-03

## 2025-03-03 RX ADMIN — Medication 400 MILLIGRAM(S): at 15:28

## 2025-03-03 RX ADMIN — Medication 1000 MILLILITER(S): at 14:24

## 2025-03-03 RX ADMIN — Medication 1000 MILLILITER(S): at 15:28

## 2025-03-03 RX ADMIN — Medication 600 MILLIGRAM(S): at 14:24

## 2025-03-03 NOTE — ED STATDOCS - PHYSICAL EXAMINATION
Constitutional: well appearing, NAD AAOx3  Eyes: EOMI, PERRL  Head: Normocephalic atraumatic  Mouth: no airway obstruction, posterior oropharynx clear without erythema or exudate, disclamation at the back of throat  Neck: supple  Cardiac: regular rate and rhythm, no MRG  Resp: Lungs CTAB  GI: Abd s/nt/nd  Neuro: CN2-12 intact, strength 5/5x4, sensation grossly intact  Skin: No rashes

## 2025-03-03 NOTE — ED STATDOCS - ATTENDING APP SHARED VISIT CONTRIBUTION OF CARE
I personally saw the patient with the [facility], and completed the key components of the history and physical exam. I then discussed the management plan with the AMIRA.

## 2025-03-03 NOTE — ED STATDOCS - CLINICAL SUMMARY MEDICAL DECISION MAKING FREE TEXT BOX
35 y/o male with a PMHx of HTN presents to the ED c/o headache, body aches, and drowsiness x4 days. Will plan for labs, flu swab, and fluids. Most likely viral infection.

## 2025-03-03 NOTE — ED STATDOCS - PATIENT PORTAL LINK FT
You can access the FollowMyHealth Patient Portal offered by [facility] by registering at the following website: [email or web address]. By joining [employer]’s FollowMyHealth portal, you will also be able to view your health information using other applications (apps) compatible with our system.

## 2025-03-03 NOTE — ED STATDOCS - PROGRESS NOTE DETAILS
patient is feeling much better, +flu, reviewed symptom management and f/u as well as return precautions -Raúl Banks PA-C

## 2025-03-03 NOTE — ED ADULT NURSE NOTE - PRIMARY CARE PROVIDER
Orders:    amoxicillin-clavulanate (AUGMENTIN) 875-125 MG per tablet; Take 1 tablet by mouth 2 times daily for 3 days    
see md note

## 2025-03-03 NOTE — ED STATDOCS - OBJECTIVE STATEMENT
35 y/o male with a PMHx of HTN presents to the ED c/o headache, body aches, and drowsiness x4 days. Pt states he thinks he has a "brain infection because the left side of brain feels numb and I can't think straight." Pt states his brother at home is sick, endorses coughing and bleeding on the right cheek in his mouth.  labs swab

## 2025-03-03 NOTE — ED ADULT NURSE NOTE - OBJECTIVE STATEMENT
35 y/o male presents to ED c/o headache, body ache, subjective fevers. Pt reports "I feel like I have a brain infection". Pt reports drowsiness. Denies medical history.

## 2025-04-25 NOTE — ED STATDOCS - WR ORDER DATE AND TIME 1
Blair Rodriguez  1954  70 y.o.    Reason for call: GERD and Dysphagia    Prep prescribed: N/A  Prep instructions reviewed with patient and sent to patient via [facility]  Is the patient currently on any injectable or oral medications for weight loss or diabetes? No  Clearance needed? No  If yes, what clearance is needed? N/A  Clearance has been requested from n/a  The patient has been scheduled for: EGD    After your procedure, you will be contacted with results. Please confirm the best phone # to reach the patient: [phone]  Family history of colon cancer? No  If yes, indicate relative: n/a  Tentative Procedure Date: 5/15/2025    Date/Place of last Scope: 2019 in [place]  Able to obtain report? yes    Family History   Problem Relation Age of Onset    Alcohol abuse Mother     Cancer Mother     Colon cancer Neg Hx      Past Medical History:   Diagnosis Date    GERD (gastroesophageal reflux disease)     Hypertension     Kidney stone     Low back pain 6 months     No Known Allergies  Past Surgical History:   Procedure Laterality Date    COLONOSCOPY  4 years    FOOT SURGERY Left 1969     Social History     Socioeconomic History    Marital status:    Tobacco Use    Smoking status: Former     Current packs/day: 0.00     Average packs/day: 1 pack/day for 15.0 years (15.0 ttl pk-yrs)     Types: Cigarettes     Start date: 1975     Quit date: 7/3/1986     Years since quittin.8     Passive exposure: Past    Smokeless tobacco: Current     Types: Chew    Tobacco comments:     Still use smokeless tobacco   Vaping Use    Vaping status: Never Used   Substance and Sexual Activity    Alcohol use: Yes     Alcohol/week: 28.0 standard drinks of alcohol     Types: 28 Drinks containing 0.5 oz of alcohol per week    Drug use: Yes     Types: Marijuana    Sexual activity: Yes     Partners: Female       Current Outpatient Medications:     allopurinol (ZYLOPRIM) 100 MG tablet, Take 1 tablet by mouth Daily., Disp: 90  tablet, Rfl: 1    amLODIPine (NORVASC) 10 MG tablet, Take 1 tablet by mouth Daily., Disp: 90 tablet, Rfl: 1    famotidine (PEPCID) 40 MG tablet, Take 1 tablet by mouth Daily. Disregard the one with refills please., Disp: 30 tablet, Rfl: 0    multivitamin with minerals (One-A-Day Proactive 65+) tablet tablet, Take 1 tablet by mouth Daily., Disp: , Rfl:     Nutritional Supplements (JOINT FORMULA PO), Take  by mouth., Disp: , Rfl:     olmesartan (BENICAR) 40 MG tablet, Take 1 tablet by mouth Daily., Disp: 90 tablet, Rfl: 1    pravastatin (PRAVACHOL) 10 MG tablet, Take 1 tablet by mouth Daily., Disp: 90 tablet, Rfl: 1    15-Oct-2022 12:24

## 2025-06-23 NOTE — ED STATDOCS - EYES, MLM
Pt ambulated into er with . Pt reports SOB onset 0200 this am. Pt denies any past respiratory history. Pt is also reporting some swelling in her feet and feeling cold.  
clear bilaterally.  Pupils equal, round, and reactive to light.